# Patient Record
Sex: FEMALE | Race: ASIAN | NOT HISPANIC OR LATINO | Employment: OTHER | URBAN - METROPOLITAN AREA
[De-identification: names, ages, dates, MRNs, and addresses within clinical notes are randomized per-mention and may not be internally consistent; named-entity substitution may affect disease eponyms.]

---

## 2017-05-16 ENCOUNTER — OFFICE VISIT - HEALTHEAST (OUTPATIENT)
Dept: FAMILY MEDICINE | Facility: CLINIC | Age: 57
End: 2017-05-16

## 2017-05-16 DIAGNOSIS — H10.10 ALLERGIC CONJUNCTIVITIS: ICD-10-CM

## 2021-05-19 ENCOUNTER — COMMUNICATION - HEALTHEAST (OUTPATIENT)
Dept: SCHEDULING | Facility: CLINIC | Age: 61
End: 2021-05-19

## 2021-05-31 VITALS — WEIGHT: 129.56 LBS

## 2021-06-10 NOTE — PROGRESS NOTES
ASSESSMENT:  1. Allergic conjunctivitis  loratadine (CLARITIN) 10 mg tablet    ketotifen (ZADITOR) 0.025 % (0.035 %) ophthalmic solution      PLAN:  Likely allergic conjunctivitis. There is no drainage, mattering, or associated URI symptoms concerning for bacterial or viral conjunctivitis. Recommend treatment with loratadine 10mg once daily and Zaditor eye drops.  Avoid scratching the eyes, apply cold compresses for relief.  Follow up with primary care provider or ophthalmologist if not improving in 3-5 days, sooner if any worsening or new symptoms.      SUBJECTIVE:   Donal Coker is a 57 y.o. female presents today with 7 days complaint of bilateral eye redness. Also complains of eye itchiness. The patient states her eyes were not red initially, but have become red and swollen since she has been itchy at them. Denies eye drainage, watering, mattering of lashes, foreign body sensation, vision changes, eye pain. No injury/trauma to the eyes.  Denies fever, chills, sneezing, rhinorrhea, nasal congestion, cough, sore throat. No sick contacts. does not wear contact lenses.     She reports similar symptoms last spring, as well as in the fall the past several years.     Interpretation provided by patient's  per her requests. She declined formal interpretor services.    There is no problem list on file for this patient.      Current Medications:  No current outpatient prescriptions on file prior to visit.     No current facility-administered medications on file prior to visit.        Allergies:   No Known Allergies    OBJECTIVE:    Vitals:    05/16/17 1221   BP: 130/80   Patient Site: Left Arm   Patient Position: Sitting   Cuff Size: Adult Regular   Pulse: 87   Resp: 16   Temp: 97.9  F (36.6  C)   TempSrc: Oral   SpO2: 95%   Weight: 129 lb 9 oz (58.8 kg)       Physical exam reveals a pleasant 57 y.o. female.   Appears healthy, alert, cooperative and in NAD.  Eyes: bilateral conjunctiva erythematous, R>L. No ciliary  flush. No drainage or mattering of the lashes. PERRLA. EOMS intact. negative foreign body.  No periorbital cellulitis.  Ears: bilateral TMs pearly grey, landmarks visualized.    Nasopharynx: pink and moist  Oropharynx: Free of erythema, inflammation or exudate.   Lymph: no cervical LAD  Lungs: Chest is clear, no wheezing or rales. Symmetric air entry throughout both lung fields.  Heart: regular rate and rhythm, no murmur, rub or gallop

## 2023-12-25 ENCOUNTER — HOSPITAL ENCOUNTER (INPATIENT)
Facility: HOSPITAL | Age: 63
LOS: 4 days | Discharge: HOME HEALTH | DRG: 603 | End: 2023-12-29
Attending: EMERGENCY MEDICINE | Admitting: FAMILY MEDICINE
Payer: MEDICARE

## 2023-12-25 ENCOUNTER — APPOINTMENT (OUTPATIENT)
Dept: ULTRASOUND IMAGING | Facility: HOSPITAL | Age: 63
DRG: 603 | End: 2023-12-25
Attending: FAMILY MEDICINE
Payer: MEDICARE

## 2023-12-25 ENCOUNTER — APPOINTMENT (OUTPATIENT)
Dept: RADIOLOGY | Facility: HOSPITAL | Age: 63
DRG: 603 | End: 2023-12-25
Payer: MEDICARE

## 2023-12-25 ENCOUNTER — APPOINTMENT (OUTPATIENT)
Dept: CT IMAGING | Facility: HOSPITAL | Age: 63
DRG: 603 | End: 2023-12-25
Payer: MEDICARE

## 2023-12-25 DIAGNOSIS — R78.81 STAPHYLOCOCCUS AUREUS BACTEREMIA WITHOUT SEPSIS: Primary | ICD-10-CM

## 2023-12-25 DIAGNOSIS — L03.90 CELLULITIS, UNSPECIFIED CELLULITIS SITE: ICD-10-CM

## 2023-12-25 DIAGNOSIS — T14.8XXA WOUND INFECTION: ICD-10-CM

## 2023-12-25 DIAGNOSIS — L08.9 WOUND INFECTION: ICD-10-CM

## 2023-12-25 DIAGNOSIS — B95.61 STAPHYLOCOCCUS AUREUS BACTEREMIA WITHOUT SEPSIS: Primary | ICD-10-CM

## 2023-12-25 LAB
ANION GAP SERPL CALCULATED.3IONS-SCNC: 9 MMOL/L (ref 7–15)
BASOPHILS # BLD AUTO: 0 10E3/UL (ref 0–0.2)
BASOPHILS NFR BLD AUTO: 0 %
BUN SERPL-MCNC: 17.1 MG/DL (ref 8–23)
CALCIUM SERPL-MCNC: 8.8 MG/DL (ref 8.8–10.2)
CHLORIDE SERPL-SCNC: 101 MMOL/L (ref 98–107)
CREAT SERPL-MCNC: 0.67 MG/DL (ref 0.51–0.95)
CRP SERPL-MCNC: 50.5 MG/L
DEPRECATED HCO3 PLAS-SCNC: 28 MMOL/L (ref 22–29)
EGFRCR SERPLBLD CKD-EPI 2021: >90 ML/MIN/1.73M2
EOSINOPHIL # BLD AUTO: 0 10E3/UL (ref 0–0.7)
EOSINOPHIL NFR BLD AUTO: 0 %
ERYTHROCYTE [DISTWIDTH] IN BLOOD BY AUTOMATED COUNT: 12.9 % (ref 10–15)
ERYTHROCYTE [SEDIMENTATION RATE] IN BLOOD BY WESTERGREN METHOD: 76 MM/HR (ref 0–30)
GLUCOSE BLDC GLUCOMTR-MCNC: 123 MG/DL (ref 70–99)
GLUCOSE SERPL-MCNC: 186 MG/DL (ref 70–99)
HBA1C MFR BLD: 5.8 %
HCT VFR BLD AUTO: 38.8 % (ref 35–47)
HGB BLD-MCNC: 12.9 G/DL (ref 11.7–15.7)
HOLD SPECIMEN: NORMAL
IMM GRANULOCYTES # BLD: 0 10E3/UL
IMM GRANULOCYTES NFR BLD: 0 %
LACTATE SERPL-SCNC: 1.8 MMOL/L (ref 0.7–2)
LYMPHOCYTES # BLD AUTO: 0.2 10E3/UL (ref 0.8–5.3)
LYMPHOCYTES NFR BLD AUTO: 4 %
MCH RBC QN AUTO: 31.6 PG (ref 26.5–33)
MCHC RBC AUTO-ENTMCNC: 33.2 G/DL (ref 31.5–36.5)
MCV RBC AUTO: 95 FL (ref 78–100)
MONOCYTES # BLD AUTO: 0.3 10E3/UL (ref 0–1.3)
MONOCYTES NFR BLD AUTO: 6 %
NEUTROPHILS # BLD AUTO: 4.4 10E3/UL (ref 1.6–8.3)
NEUTROPHILS NFR BLD AUTO: 90 %
NRBC # BLD AUTO: 0 10E3/UL
NRBC BLD AUTO-RTO: 0 /100
PLATELET # BLD AUTO: 120 10E3/UL (ref 150–450)
POTASSIUM SERPL-SCNC: 4 MMOL/L (ref 3.4–5.3)
RBC # BLD AUTO: 4.08 10E6/UL (ref 3.8–5.2)
SODIUM SERPL-SCNC: 138 MMOL/L (ref 135–145)
WBC # BLD AUTO: 4.9 10E3/UL (ref 4–11)

## 2023-12-25 PROCEDURE — 250N000011 HC RX IP 250 OP 636

## 2023-12-25 PROCEDURE — 36415 COLL VENOUS BLD VENIPUNCTURE: CPT

## 2023-12-25 PROCEDURE — 80048 BASIC METABOLIC PNL TOTAL CA: CPT

## 2023-12-25 PROCEDURE — 99285 EMERGENCY DEPT VISIT HI MDM: CPT | Mod: 25

## 2023-12-25 PROCEDURE — 120N000001 HC R&B MED SURG/OB

## 2023-12-25 PROCEDURE — 250N000011 HC RX IP 250 OP 636: Performed by: EMERGENCY MEDICINE

## 2023-12-25 PROCEDURE — 70450 CT HEAD/BRAIN W/O DYE: CPT | Mod: ME

## 2023-12-25 PROCEDURE — 258N000003 HC RX IP 258 OP 636: Performed by: EMERGENCY MEDICINE

## 2023-12-25 PROCEDURE — 96365 THER/PROPH/DIAG IV INF INIT: CPT

## 2023-12-25 PROCEDURE — 86140 C-REACTIVE PROTEIN: CPT

## 2023-12-25 PROCEDURE — 96367 TX/PROPH/DG ADDL SEQ IV INF: CPT

## 2023-12-25 PROCEDURE — 93005 ELECTROCARDIOGRAM TRACING: CPT

## 2023-12-25 PROCEDURE — 85025 COMPLETE CBC W/AUTO DIFF WBC: CPT

## 2023-12-25 PROCEDURE — 99223 1ST HOSP IP/OBS HIGH 75: CPT | Mod: AI | Performed by: FAMILY MEDICINE

## 2023-12-25 PROCEDURE — 71046 X-RAY EXAM CHEST 2 VIEWS: CPT

## 2023-12-25 PROCEDURE — 96361 HYDRATE IV INFUSION ADD-ON: CPT

## 2023-12-25 PROCEDURE — 87186 SC STD MICRODIL/AGAR DIL: CPT

## 2023-12-25 PROCEDURE — 87205 SMEAR GRAM STAIN: CPT

## 2023-12-25 PROCEDURE — 250N000011 HC RX IP 250 OP 636: Performed by: FAMILY MEDICINE

## 2023-12-25 PROCEDURE — 250N000013 HC RX MED GY IP 250 OP 250 PS 637: Performed by: EMERGENCY MEDICINE

## 2023-12-25 PROCEDURE — 99222 1ST HOSP IP/OBS MODERATE 55: CPT | Performed by: PODIATRIST

## 2023-12-25 PROCEDURE — 85652 RBC SED RATE AUTOMATED: CPT

## 2023-12-25 PROCEDURE — 83605 ASSAY OF LACTIC ACID: CPT

## 2023-12-25 PROCEDURE — 93925 LOWER EXTREMITY STUDY: CPT

## 2023-12-25 PROCEDURE — 258N000003 HC RX IP 258 OP 636

## 2023-12-25 PROCEDURE — 83036 HEMOGLOBIN GLYCOSYLATED A1C: CPT | Performed by: FAMILY MEDICINE

## 2023-12-25 PROCEDURE — 72125 CT NECK SPINE W/O DYE: CPT | Mod: MF

## 2023-12-25 PROCEDURE — 87149 DNA/RNA DIRECT PROBE: CPT

## 2023-12-25 PROCEDURE — 73610 X-RAY EXAM OF ANKLE: CPT | Mod: RT

## 2023-12-25 RX ORDER — DOCUSATE SODIUM 100 MG/1
100 CAPSULE, LIQUID FILLED ORAL 2 TIMES DAILY
Status: DISCONTINUED | OUTPATIENT
Start: 2023-12-25 | End: 2023-12-29 | Stop reason: HOSPADM

## 2023-12-25 RX ORDER — VANCOMYCIN HYDROCHLORIDE 500 MG/10ML
500 INJECTION, POWDER, LYOPHILIZED, FOR SOLUTION INTRAVENOUS EVERY 12 HOURS
Status: DISCONTINUED | OUTPATIENT
Start: 2023-12-26 | End: 2023-12-27

## 2023-12-25 RX ORDER — ONDANSETRON 2 MG/ML
4 INJECTION INTRAMUSCULAR; INTRAVENOUS EVERY 6 HOURS PRN
Status: DISCONTINUED | OUTPATIENT
Start: 2023-12-25 | End: 2023-12-29 | Stop reason: HOSPADM

## 2023-12-25 RX ORDER — NICOTINE POLACRILEX 4 MG
15-30 LOZENGE BUCCAL
Status: DISCONTINUED | OUTPATIENT
Start: 2023-12-25 | End: 2023-12-29 | Stop reason: HOSPADM

## 2023-12-25 RX ORDER — PIPERACILLIN SODIUM, TAZOBACTAM SODIUM 3; .375 G/15ML; G/15ML
3.38 INJECTION, POWDER, LYOPHILIZED, FOR SOLUTION INTRAVENOUS ONCE
Status: COMPLETED | OUTPATIENT
Start: 2023-12-25 | End: 2023-12-25

## 2023-12-25 RX ORDER — CALCIUM CARBONATE 500 MG/1
1000 TABLET, CHEWABLE ORAL 4 TIMES DAILY PRN
Status: DISCONTINUED | OUTPATIENT
Start: 2023-12-25 | End: 2023-12-29 | Stop reason: HOSPADM

## 2023-12-25 RX ORDER — CEFAZOLIN SODIUM 1 G/50ML
750 SOLUTION INTRAVENOUS ONCE
Status: COMPLETED | OUTPATIENT
Start: 2023-12-25 | End: 2023-12-25

## 2023-12-25 RX ORDER — AMOXICILLIN 250 MG
2 CAPSULE ORAL 2 TIMES DAILY PRN
Status: DISCONTINUED | OUTPATIENT
Start: 2023-12-25 | End: 2023-12-29 | Stop reason: HOSPADM

## 2023-12-25 RX ORDER — ACETAMINOPHEN 325 MG/1
650 TABLET ORAL ONCE
Status: DISCONTINUED | OUTPATIENT
Start: 2023-12-25 | End: 2023-12-25

## 2023-12-25 RX ORDER — QUETIAPINE FUMARATE 25 MG/1
25 TABLET, FILM COATED ORAL EVERY 6 HOURS PRN
Status: DISCONTINUED | OUTPATIENT
Start: 2023-12-25 | End: 2023-12-29 | Stop reason: HOSPADM

## 2023-12-25 RX ORDER — PIPERACILLIN SODIUM, TAZOBACTAM SODIUM 3; .375 G/15ML; G/15ML
3.38 INJECTION, POWDER, LYOPHILIZED, FOR SOLUTION INTRAVENOUS EVERY 8 HOURS
Status: DISCONTINUED | OUTPATIENT
Start: 2023-12-25 | End: 2023-12-28

## 2023-12-25 RX ORDER — AMOXICILLIN 250 MG
1 CAPSULE ORAL 2 TIMES DAILY PRN
Status: DISCONTINUED | OUTPATIENT
Start: 2023-12-25 | End: 2023-12-29 | Stop reason: HOSPADM

## 2023-12-25 RX ORDER — ACETAMINOPHEN 325 MG/1
650 TABLET ORAL EVERY 4 HOURS PRN
Status: DISCONTINUED | OUTPATIENT
Start: 2023-12-25 | End: 2023-12-29 | Stop reason: HOSPADM

## 2023-12-25 RX ORDER — ONDANSETRON 4 MG/1
4 TABLET, ORALLY DISINTEGRATING ORAL EVERY 6 HOURS PRN
Status: DISCONTINUED | OUTPATIENT
Start: 2023-12-25 | End: 2023-12-29 | Stop reason: HOSPADM

## 2023-12-25 RX ORDER — DEXTROSE MONOHYDRATE 25 G/50ML
25-50 INJECTION, SOLUTION INTRAVENOUS
Status: DISCONTINUED | OUTPATIENT
Start: 2023-12-25 | End: 2023-12-29 | Stop reason: HOSPADM

## 2023-12-25 RX ORDER — ACETAMINOPHEN 650 MG/1
650 SUPPOSITORY RECTAL EVERY 4 HOURS PRN
Status: DISCONTINUED | OUTPATIENT
Start: 2023-12-25 | End: 2023-12-29 | Stop reason: HOSPADM

## 2023-12-25 RX ORDER — ACETAMINOPHEN 650 MG/1
650 SUPPOSITORY RECTAL ONCE
Status: COMPLETED | OUTPATIENT
Start: 2023-12-25 | End: 2023-12-25

## 2023-12-25 RX ORDER — LIDOCAINE 40 MG/G
CREAM TOPICAL
Status: DISCONTINUED | OUTPATIENT
Start: 2023-12-25 | End: 2023-12-29 | Stop reason: HOSPADM

## 2023-12-25 RX ADMIN — PIPERACILLIN AND TAZOBACTAM 3.38 G: 3; .375 INJECTION, POWDER, FOR SOLUTION INTRAVENOUS at 16:03

## 2023-12-25 RX ADMIN — ACETAMINOPHEN 650 MG: 650 SUPPOSITORY RECTAL at 14:58

## 2023-12-25 RX ADMIN — SODIUM CHLORIDE 500 ML: 9 INJECTION, SOLUTION INTRAVENOUS at 16:03

## 2023-12-25 RX ADMIN — SODIUM CHLORIDE 500 ML: 9 INJECTION, SOLUTION INTRAVENOUS at 15:23

## 2023-12-25 RX ADMIN — PIPERACILLIN AND TAZOBACTAM 3.38 G: 3; .375 INJECTION, POWDER, FOR SOLUTION INTRAVENOUS at 22:16

## 2023-12-25 RX ADMIN — VANCOMYCIN HYDROCHLORIDE 750 MG: 5 INJECTION, POWDER, LYOPHILIZED, FOR SOLUTION INTRAVENOUS at 17:18

## 2023-12-25 ASSESSMENT — ENCOUNTER SYMPTOMS
FEVER: 1
FATIGUE: 1
WEAKNESS: 1

## 2023-12-25 ASSESSMENT — ACTIVITIES OF DAILY LIVING (ADL)
ADLS_ACUITY_SCORE: 37
ADLS_ACUITY_SCORE: 35
ADLS_ACUITY_SCORE: 37
DEPENDENT_IADLS:: CLEANING;COOKING;LAUNDRY;SHOPPING;MEAL PREPARATION;MEDICATION MANAGEMENT;MONEY MANAGEMENT;TRANSPORTATION;INCONTINENCE
ADLS_ACUITY_SCORE: 35
ADLS_ACUITY_SCORE: 35

## 2023-12-25 NOTE — ED NOTES
BP low 84/52 with MAP of 64. Pt put in reverse trendelenburg. PA notified. Recheck vital when fluid is in per PA. Pressure bagging fluids.

## 2023-12-25 NOTE — PLAN OF CARE
Orthopedic surgery plan of care note:  Received a call from the emergency department regarding this patient.  No imaging obtained as of yet.  Per report ankle wound that recently began draining.  CRP 55.  Recommended MRI of the ankle as well as plain radiographs.  Suspect that this is a soft tissue wound issue and not involving the ankle joint given a CRP of 55 which would rule against deep infection.  Recommend podiatry consultation.  Should there be involvement of the ankle joint proper, orthopedics will assume care.      Lamonte Colby MD  [facility]

## 2023-12-25 NOTE — ED TRIAGE NOTES
Pt with right ankle swelling that started out as a black spot and has grown.  Family reports area is now draining pus.

## 2023-12-25 NOTE — MEDICATION SCRIBE - ADMISSION MEDICATION HISTORY
Medication Scribe Admission Medication History    Admission medication history is complete. The information provided in this note is only as accurate as the sources available at the time of the update.    Information Source(s): Family member and CareEverywhere/SureScripts via in-person    Pertinent Information: daughter manages med's  Daughter states doctor took pt off all med's besides humira, pt currently ONLY on humira     Changes made to PTA medication list:  Added: None  Deleted: arava,metformin,omeprazole,pravastatin,prednisone  Changed: None    Medication Affordability:  Not including over the counter (OTC) medications, was there a time in the past 3 months when you did not take your medications as prescribed because of cost?: No    Allergies reviewed with patient and updates made in EHR: yes    Medication History Completed By: VANDANA LI 12/25/2023 3:59 PM    PTA Med List   Medication Sig Last Dose    adalimumab (HUMIRA *CF*) 40 MG/0.4ML pen kit Inject 40 mg Subcutaneous every 14 days Past Week at past week

## 2023-12-25 NOTE — ED PROVIDER NOTES
Emergency Department Midlevel Supervisory Note     I personally saw the patient and performed a substantive portion of the visit including all aspects of the medical decision making.    ED Course:  2:20 PM Merry Montelongo PA-C staffed patient with me. I agree with their assessment and plan of management, and I will see the patient.  2:25 PM I met with the patient to introduce myself, gather additional history, perform my initial exam, and discuss the plan.     63 year old female, history of RA on Humira, HLD and Alzheimer's dementia, who presents with family for evaluation of atraumatic right ankle wound infection. She developed a callus-like wound to this area months ago, but it recently has started to blacken with some bleeding and purulent drainage. She also had tactile fever this morning; patient febrile to 102.3 on presentation to ED.     On exam, there is erythema and swelling over the right lateral malleolus with underlying necrosis  (SEE PHOTO BELOW) concerning for infection.     [facility] consulted and advised to hold on antibiotics unless she has signs of sepsis. They also advised to call Podiatry.     Labs remarkable for no leukocytosis (WBC 4.9) with normal lactate (1.8). CRP and ESR are elevated (50.5 and 76, respectively).     She has no electrolyte derangements or renal impairment. Serum glucose elevated (186) without elevated anion gap or acidosis to suggest DKA.    Blood cultures were obtained.    [facility] called back and advised antibiotics and admission, however continue to try to reach Podiatry.    Patient given IV vancomycin and IV Zosyn.    X-rays right ankle demonstrated normal joint spaces and alignment with no acute fracture. No evidence of osseous destruction to suggest osteomyelitis.    MRI ankle ordered for further evaluation.     Podiatry will present to see the patient within the hour.    Patient also had a fall on Friday (4 days ago) hitting the front and back of  her head. No LOC, nausea / vomiting or behavior changes and she is not anticoagulated.     Head CT and cervical spine CT performed and were without acute traumatic findings.    Patient became hypotensive (upper 70s to lower 80s) for which she was given 30 ml/kg IV fluids with improvement. Given her small size (70 pounds), I suspect her blood pressures run lower; I did look through clinic notes and I was unable to find a documented blood pressure.    EKG performed and was of poor quality due to tremor making rhythm difficult to determine. Cardiology consulted and did not think there was anything too concerning for a heart block, however difficult to completely rule it out due to poor quality.    Patient admitted to Hospital Service. Blood pressures improved with IV fluids.       FINAL IMPRESSION:    ICD-10-CM    1. Wound infection  T14.8XXA     L08.9       2. Cellulitis, unspecified cellulitis site  L03.90           MEDICATIONS GIVEN IN THE EMERGENCY DEPARTMENT:  Medications   vancomycin (VANCOCIN) 750 mg in sodium chloride 0.9 % 250 mL intermittent infusion (750 mg Intravenous $New Bag 12/25/23 1718)   acetaminophen (TYLENOL) Suppository 650 mg (650 mg Rectal $Given 12/25/23 1458)   sodium chloride 0.9% BOLUS 500 mL (0 mLs Intravenous Stopped 12/25/23 1545)   sodium chloride 0.9% BOLUS 500 mL (0 mLs Intravenous Stopped 12/25/23 1723)   piperacillin-tazobactam (ZOSYN) 3.375 g vial to attach to  mL bag (0 g Intravenous Stopped 12/25/23 1646)       NEW PRESCRIPTIONS STARTED AT TODAY'S ED VISIT:  New Prescriptions    No medications on file           CHIEF COMPLAINT:  Wound Infection      Triage Note:      Pt with right ankle swelling that started out as a black spot and has grown.  Family reports area is now draining pus.        HPI     HPI     Donal Sheela is a 63 year old female with a pertinent history of RA on Humira, HLD and Alzheimer's dementia, who presents to this ED with family for evaluation of wound  infection.    History obtained from patient's  and her daughter; patient is not communicative secondary to dementia.    Per family, patient had a fall on Friday hitting the front of her head and then the posterior aspect of her head. She did not sustain LOC and has not had any vomiting. She is not anticoagulated. She seemed fine, so family did not bring her in for evaluation after the fall.    They report that she had a callus-like wound to the right lateral malleolus that has been there for months. Recently, the wound has started to blacken and it started bleeding and draining some purulent fluid Friday. Today she felt warm and family had concern that she had a fever.       Medical History     Past Medical History:   Diagnosis Date    Dementia (H)     Depression     DM2 (diabetes mellitus, type 2) (H)     Hypercholesteremia     Rheumatoid aortitis        Past Surgical History:   Procedure Laterality Date     SECTION         No family history on file.    Social History     Tobacco Use    Smoking status: Never       adalimumab (HUMIRA *CF*) 40 MG/0.4ML pen kit  BD ULTRA FINE PEN NEEDLES  Blood Glucose Monitoring Suppl (CONTOUR BLOOD GLUCOSE SYSTEM) YENI  Glucose Blood (JESSI CONTOUR TEST) strip        Physical Exam     First Vitals:  Patient Vitals for the past 24 hrs:   BP Temp Temp src Pulse Resp SpO2 Height Weight   23 1745 92/68 -- -- 100 24 100 % -- --   23 1730 (!) 89/55 -- -- 74 19 100 % -- --   23 1700 117/57 98.7  F (37.1  C) Rectal 98 24 100 % -- --   23 1655 117/57 -- -- 92 24 100 % -- --   23 1600 (!) 136/94 -- -- 96 26 100 % -- --   23 1545 100/49 -- -- 91 24 100 % -- --   23 1540 (!) 82/51 -- -- 87 22 100 % -- --   23 1533 (!) 84/52 -- -- -- -- -- -- --   23 1530 (!) 79/50 -- -- 77 12 99 % -- --   23 1529 -- 100.4  F (38  C) Oral -- -- -- -- --   23 1515 106/55 -- -- 80 23 99 % -- --   23 1500 96/51 -- -- 116 18 98 %  "1.473 m (4' 10\") 31.8 kg (70 lb)   12/25/23 1400 (!) 164/80 (!) 102.3  F (39.1  C) -- 99 -- 93 % -- --       PHYSICAL EXAM:   Physical Exam    GENERAL: Awake, alert.  Mildly agitated, however RN is placing IV. Patient is very frail.  HEENT: Normocephalic. Ecchymosis and mild frontal hematoma over forehead. Pupils equal, round and reactive. Conjunctiva normal.  NECK: No stridor.  PULMONARY: Symmetrical breath sounds without distress.  Lungs clear to auscultation bilaterally without wheezes, rhonchi or rales.  CARDIO: Borderline tachycardic rate with regular rhythm.  No significant murmur, rub or gallop.    ABDOMINAL: Abdomen soft, non-distended and non-tender to palpation.    EXTREMITIES: There is erythema and swelling over the right lateral malleolus with underlying necrosis - SEE PHOTO BELOW. The foot is warm and well perfused with strong pedal pulse.       NEURO: Awake and alert. Cranial nerves grossly intact.  No focal motor deficit.  PSYCH: Mildly agitated.   SKIN: Erythema, swelling with underlying necrosis - see photo          Results     LAB:  All pertinent labs reviewed and interpreted  Labs Ordered and Resulted from Time of ED Arrival to Time of ED Departure   BASIC METABOLIC PANEL - Abnormal       Result Value    Sodium 138      Potassium 4.0      Chloride 101      Carbon Dioxide (CO2) 28      Anion Gap 9      Urea Nitrogen 17.1      Creatinine 0.67      GFR Estimate >90      Calcium 8.8      Glucose 186 (*)    ERYTHROCYTE SEDIMENTATION RATE AUTO - Abnormal    Erythrocyte Sedimentation Rate 76 (*)    CRP INFLAMMATION - Abnormal    CRP Inflammation 50.50 (*)    CBC WITH PLATELETS AND DIFFERENTIAL - Abnormal    WBC Count 4.9      RBC Count 4.08      Hemoglobin 12.9      Hematocrit 38.8      MCV 95      MCH 31.6      MCHC 33.2      RDW 12.9      Platelet Count 120 (*)     % Neutrophils 90      % Lymphocytes 4      % Monocytes 6      % Eosinophils 0      % Basophils 0      % Immature Granulocytes 0      NRBCs " per 100 WBC 0      Absolute Neutrophils 4.4      Absolute Lymphocytes 0.2 (*)     Absolute Monocytes 0.3      Absolute Eosinophils 0.0      Absolute Basophils 0.0      Absolute Immature Granulocytes 0.0      Absolute NRBCs 0.0     LACTIC ACID WHOLE BLOOD - Normal    Lactic Acid 1.8     BLOOD CULTURE   BLOOD CULTURE   AEROBIC BACTERIAL CULTURE ROUTINE       RADIOLOGY:  Ankle XR, G/E 3 views, right   Final Result   IMPRESSION: Normal joint spaces and alignment. No or acute fracture. No evidence of osseous destruction to suggest osteomyelitis, however MRI is more sensitive if indicated. Plantar and retrocalcaneal enthesophytes.      Chest XR,  PA & LAT   Final Result   IMPRESSION: Heart is normal in size. Lungs are clear.      CT Cervical Spine w/o Contrast   Final Result   IMPRESSION:      HEAD CT:   1. Senescent changes and sequelae of chronic microangiopathy without acute intracranial abnormality.      CERVICAL SPINE CT:   1. No acute traumatic injury of the cervical spine.       Head CT w/o contrast   Final Result   IMPRESSION:      HEAD CT:   1. Senescent changes and sequelae of chronic microangiopathy without acute intracranial abnormality.      CERVICAL SPINE CT:   1. No acute traumatic injury of the cervical spine.       MR Ankle Right w/o & w Contrast    (Results Pending)     EC2023, 16:48; narrow complex undetermined rhythm with tachycardic rate of 112 bpm; prolonged QT (515ms); no obvious ST-T wave changes consistent with ACS or pericarditis; compared to previous EKG dated 2021, the rhythm is now undetermined, however EKG is of poor quality    EKG independently reviewed and interpreted by MD Radha Bobby MD  Emergency Medicine  [facility] EMERGENCY DEPARTMENT           Radha Cortes MD  [phone]

## 2023-12-25 NOTE — ED PROVIDER NOTES
EMERGENCY DEPARTMENT ENCOUNTER      NAME: Donal Coker  AGE: 63 year old female  YOB: 1960  MRN: [id number]  EVALUATION DATE & TIME: 12/25/2023  2:06 PM    PCP: No primary care provider on file.    ED PROVIDER: Merry Montelongo PA-C      Chief Complaint   Patient presents with    Wound Infection       FINAL IMPRESSION:  1. Wound infection    2. Cellulitis, unspecified cellulitis site      ED COURSE & MEDICAL DECISION MAKING:    Pertinent Labs & Imaging studies reviewed. (See chart for details)  63 year old female presents to the Emergency Department for evaluation of ankle pain.  4 days ago patient's family notes when the patient was leaned up against the bed patient slumped over and fell onto the ground.  Patient hit her head at that time.  No blood thinners on board.  Patient's family reports that patient has been acting normally since the incident.  Patient has Alzheimer's and does not communicate or able to express when in pain.  Patient's daughter noted a chronic callus on the right lateral malleolus.  Today patient started to notice that the callus started to drain pus and had surrounding redness and warmth.  Patient's  noted the patient having a fever this morning.  Vital signs reviewed patient is hypertensive and febrile.  This is most likely due to her ankle infection as well as pain.  Vitals improved significantly throughout her ED visit.  On exam, pupils are equal round and reactive.  Patient moving all 4 extremities without difficulty.  Patient has a large bruise over her right eye from her previous fall.  Patient has a quarter sized wound on the lateral right malleolus with surrounding erythema, edema and warmth.  There is purulent drainage.  No bone visualized.  Patient is tender to palpation of that right ankle as she pulls away during palpation.  Pulses are 2+ bilaterally.    Lactic acid is 1.8.  CBC no white blood cell elevation.  Hemoglobin is stable..  Basic is reassuring.  CRP  is 50.50 and ESR 76. EKG is pending. X-ray of the ankle is pending.   Head CT and neck CT is pending.  I spoke with [facility] who recommended doing an MRI to determine whether the infection is in the bone versus tissue infection.  MR of the ankle is pending. Dr. Campbell would like to postpone antibiotics and does not want a wound culture.  Dr. Campbell from [facility] also recommended I contact podiatry and discussed the case with them.  Podiatry recommended admission and antibiotics. Zosyn and Vanco were started on the patient.  We started fluids on the patient.  Patient was given Tylenol for her fever which improved.  Patient is resting comfortably. I Spoke with the hospitalist who agrees to admit the patient.      ED COURSE:   2:16 PM I saw the patient.   2:24 PM Staffed with Dr. Cortes.  2:42 PM Spoke with Dr Colby, [facility].  3:55 PM Spoke with Dr. Powers, Podiatry.  4:00 PM spoke with family and updated the patient and family on the plan.  All questions answered.  They agree to admission.  All questions answered.  4:07 PM Spoke with Dr. Pope, Hospitalist regarding patient's admission.         At the conclusion of the encounter I discussed the results of all of the tests and the disposition. The questions were answered. The patient or family acknowledged understanding and was agreeable with the care plan.     0 minutes of critical care time       Additional Documentation    History:  Supplemental history from: Documented in chart, if applicable  External Record(s) reviewed: Outpatient Record: Office visit to [facility] for follow up on 8/28/2023    Work Up:  Chart documentation includes differential considered and any EKGs or imaging interpreted by provider.  In additional to work up documented, I considered the following work up: Documented in chart, if applicable.    External consultation:  Discussion of management with another provider: Hospitalist and  "Orthopedics    Complicating factors:  Care impacted by chronic illness: Dementia, Diabetes, and Other: Osteoarthritis and RA  Care affected by social determinants of health: N/A    Disposition considerations: Admit.      MEDICATIONS GIVEN IN THE EMERGENCY:  Medications   piperacillin-tazobactam (ZOSYN) 3.375 g vial to attach to  mL bag (3.375 g Intravenous $New Bag 12/25/23 1603)   acetaminophen (TYLENOL) Suppository 650 mg (650 mg Rectal $Given 12/25/23 8988)   sodium chloride 0.9% BOLUS 500 mL (0 mLs Intravenous Stopped 12/25/23 1545)   sodium chloride 0.9% BOLUS 500 mL (500 mLs Intravenous $New Bag 12/25/23 1603)       NEW PRESCRIPTIONS STARTED AT TODAY'S ER VISIT  New Prescriptions    No medications on file          =================================================================    HPI    Patient information was obtained from: Patient's family    Use of : N/A        Donal Coker is a 63 year old female with a pertinent history of type 2 diabetes mellitus, HLD, Alzheimer's disease, osteoarthritis, and RA, who presents to this ED via private car for evaluation of wound infection.    Per patient's family members, reports patient has \"dry skin\" and callus on the right lower lateral aspect of right foot that initially appeared black for several months. Mentions wound started to bleed and drain pus ~3 days ago (12/22). She has been increasingly weak and fatigue the past 3 days. She had a fever of 100 F. She has been rapidly unintenionally losing weight and declining. She has been increasingly weak, fatige, prone to fall. A few days ago, patient fell two times, one instance was when she was slumped on the side of the bed. She did hit the front and posterior aspect of her head, and was not evaluated then. At baseline, patient was able to walk slowly on her own, but the since this past WKND, she hasn't been able to stand without assistance. Patient has Parkinson's disease and can't communicate or express " "when in pain. She has a hx of DM and RA, was recently taken of metformin, and only daily medicaion is Humira for RA. No cardiac history. Patient's BG hasn't been checked regularly. No other reported complaints or concerns at this time.    Per chart review, the patient presented to [facility] for follow up on 2023. CXR was negative. Noted is a 8 mm calcification right upper quadrant may represent a calcified gallstone or kidney stone. Advised to stop taking metaformin. Continue taking calcium & vitamin D supplement. On 2023, patient's hgba1c was 6.1 %. ALT was 130 and AST was 58. Hep A IgM Ab was negative. She was prescribed mupirocin.    REVIEW OF SYSTEMS   Review of Systems   Constitutional:  Positive for fatigue and fever.   Skin:         Positive for callus with drainage on right lower lateral aspect of right foot   Neurological:  Positive for weakness (generalized).   All other systems reviewed and are negative.       PAST MEDICAL HISTORY:  Past Medical History:   Diagnosis Date    Dementia (H)     Depression     DM2 (diabetes mellitus, type 2) (H)     Hypercholesteremia     Rheumatoid aortitis        PAST SURGICAL HISTORY:  Past Surgical History:   Procedure Laterality Date     SECTION             CURRENT MEDICATIONS:    adalimumab (HUMIRA *CF*) 40 MG/0.4ML pen kit  BD ULTRA FINE PEN NEEDLES  Blood Glucose Monitoring Suppl (CONTOUR BLOOD GLUCOSE SYSTEM) YENI  Glucose Blood (JESSI CONTOUR TEST) strip        ALLERGIES:  No Known Allergies    FAMILY HISTORY:  No family history on file.    SOCIAL HISTORY:   Social History     Socioeconomic History    Marital status:    Tobacco Use    Smoking status: Never       VITALS:  BP (!) 136/94   Pulse 96   Temp 100.4  F (38  C) (Oral)   Resp 26   Ht 1.473 m (4' 10\")   Wt 31.8 kg (70 lb)   SpO2 100%   BMI 14.63 kg/m      PHYSICAL EXAM    Physical Exam  Vitals and nursing note reviewed.   Constitutional:       Appearance: " Normal appearance.   HENT:      Head: Atraumatic.      Right Ear: External ear normal.      Left Ear: External ear normal.      Nose: Nose normal.      Mouth/Throat:      Mouth: Mucous membranes are moist.   Eyes:      Conjunctiva/sclera: Conjunctivae normal.      Pupils: Pupils are equal, round, and reactive to light.   Cardiovascular:      Rate and Rhythm: Normal rate and regular rhythm.      Pulses: Normal pulses.      Heart sounds: Normal heart sounds. No murmur heard.     No friction rub. No gallop.   Pulmonary:      Effort: Pulmonary effort is normal.      Breath sounds: Normal breath sounds. No wheezing or rales.   Abdominal:      Tenderness: There is no abdominal tenderness. There is no guarding or rebound.   Musculoskeletal:      Cervical back: Normal range of motion.      Comments: Right lateral malleolus has a quarter size wound that has surrounding erythema, edema, warmth.  No ecchymosis.  Purulent drainage.  Right ankle is tender to palpation.  Remainder of extremities are nontender to palpation.  Chest wall is nontender to palpation.  Abdomen is soft and nontender.  Cervical, thoracic, lumbar, sacral paraspinal muscles and spasm are nontender to palpation.  Scalp is nontender to palpation.   Skin:     General: Skin is dry.   Neurological:      Mental Status: She is alert. Mental status is at baseline.   Psychiatric:         Mood and Affect: Mood normal.         Thought Content: Thought content normal.          LAB:  All pertinent labs reviewed and interpreted.  Labs Ordered and Resulted from Time of ED Arrival to Time of ED Departure   BASIC METABOLIC PANEL - Abnormal       Result Value    Sodium 138      Potassium 4.0      Chloride 101      Carbon Dioxide (CO2) 28      Anion Gap 9      Urea Nitrogen 17.1      Creatinine 0.67      GFR Estimate >90      Calcium 8.8      Glucose 186 (*)    ERYTHROCYTE SEDIMENTATION RATE AUTO - Abnormal    Erythrocyte Sedimentation Rate 76 (*)    CRP INFLAMMATION -  Abnormal    CRP Inflammation 50.50 (*)    CBC WITH PLATELETS AND DIFFERENTIAL - Abnormal    WBC Count 4.9      RBC Count 4.08      Hemoglobin 12.9      Hematocrit 38.8      MCV 95      MCH 31.6      MCHC 33.2      RDW 12.9      Platelet Count 120 (*)     % Neutrophils 90      % Lymphocytes 4      % Monocytes 6      % Eosinophils 0      % Basophils 0      % Immature Granulocytes 0      NRBCs per 100 WBC 0      Absolute Neutrophils 4.4      Absolute Lymphocytes 0.2 (*)     Absolute Monocytes 0.3      Absolute Eosinophils 0.0      Absolute Basophils 0.0      Absolute Immature Granulocytes 0.0      Absolute NRBCs 0.0     LACTIC ACID WHOLE BLOOD - Normal    Lactic Acid 1.8     BLOOD CULTURE   BLOOD CULTURE   AEROBIC BACTERIAL CULTURE ROUTINE        RADIOLOGY:  Reviewed all pertinent imaging. Please see official radiology report.  Head CT w/o contrast    (Results Pending)   CT Cervical Spine w/o Contrast    (Results Pending)   Ankle XR, G/E 3 views, right    (Results Pending)   Chest XR,  PA & LAT    (Results Pending)   MR Ankle Right w/o & w Contrast    (Results Pending)     EKG is pending. See attending note.    I, Marichuy Pineda, am serving as a scribe to document services personally performed by Merry Montelongo PA-C, based on my observation and the provider's statements to me. I, Merry Montelongo PA-C, attest that Marichuy Pineda is acting in a scribe capacity, has observed my performance of the services and has documented them in accordance with my direction.    Merry Montelongo PA-C  [facility] EMERGENCY DEPARTMENT  [address] [place] [id number]  [phone]     Merry Montelongo PA-C  12/25/23 9889       Merry Montelongo PA-C  12/25/23 7548

## 2023-12-25 NOTE — PHARMACY-VANCOMYCIN DOSING SERVICE
Pharmacy Vancomycin Initial Note  Date of Service 2023  Patient's  1960  63 year old, female    Indication: Sepsis    Current estimated CrCl = Estimated Creatinine Clearance: 43.1 mL/min (based on SCr of 0.67 mg/dL).    Creatinine for last 3 days  2023:  2:23 PM Creatinine 0.67 mg/dL    Recent Vancomycin Level(s) for last 3 days  No results found for requested labs within last 3 days.      Vancomycin IV Administrations (past 72 hours)        No vancomycin orders with administrations in past 72 hours.                    Nephrotoxins and other renal medications (From now, onward)      Start     Dose/Rate Route Frequency Ordered Stop    23 1700  vancomycin (VANCOCIN) 750 mg in sodium chloride 0.9 % 250 mL intermittent infusion         750 mg  over 60 Minutes Intravenous ONCE 23 1650              Contrast Orders - past 72 hours (72h ago, onward)      None            Plan:  Vancomycin  750 mg IV once  Please re-consult pharmacist if vancomycin is to be continued inpatient    Leia Maharaj, PharmD, BCPS 23 4:51 PM

## 2023-12-26 ENCOUNTER — APPOINTMENT (OUTPATIENT)
Dept: ULTRASOUND IMAGING | Facility: HOSPITAL | Age: 63
DRG: 603 | End: 2023-12-26
Attending: FAMILY MEDICINE
Payer: MEDICARE

## 2023-12-26 ENCOUNTER — APPOINTMENT (OUTPATIENT)
Dept: MRI IMAGING | Facility: HOSPITAL | Age: 63
DRG: 603 | End: 2023-12-26
Attending: FAMILY MEDICINE
Payer: MEDICARE

## 2023-12-26 LAB
ANION GAP SERPL CALCULATED.3IONS-SCNC: 6 MMOL/L (ref 7–15)
BUN SERPL-MCNC: 10.5 MG/DL (ref 8–23)
CALCIUM SERPL-MCNC: 8.6 MG/DL (ref 8.8–10.2)
CHLORIDE SERPL-SCNC: 107 MMOL/L (ref 98–107)
CREAT SERPL-MCNC: 0.62 MG/DL (ref 0.51–0.95)
CRP SERPL-MCNC: 57.1 MG/L
DEPRECATED HCO3 PLAS-SCNC: 28 MMOL/L (ref 22–29)
EGFRCR SERPLBLD CKD-EPI 2021: >90 ML/MIN/1.73M2
ENTEROCOCCUS FAECALIS: NOT DETECTED
ENTEROCOCCUS FAECIUM: NOT DETECTED
ERYTHROCYTE [DISTWIDTH] IN BLOOD BY AUTOMATED COUNT: 13 % (ref 10–15)
GLUCOSE BLDC GLUCOMTR-MCNC: 126 MG/DL (ref 70–99)
GLUCOSE BLDC GLUCOMTR-MCNC: 228 MG/DL (ref 70–99)
GLUCOSE BLDC GLUCOMTR-MCNC: 256 MG/DL (ref 70–99)
GLUCOSE BLDC GLUCOMTR-MCNC: 90 MG/DL (ref 70–99)
GLUCOSE SERPL-MCNC: 105 MG/DL (ref 70–99)
HCT VFR BLD AUTO: 33 % (ref 35–47)
HGB BLD-MCNC: 10.9 G/DL (ref 11.7–15.7)
LISTERIA SPECIES (DETECTED/NOT DETECTED): NOT DETECTED
MCH RBC QN AUTO: 31.7 PG (ref 26.5–33)
MCHC RBC AUTO-ENTMCNC: 33 G/DL (ref 31.5–36.5)
MCV RBC AUTO: 96 FL (ref 78–100)
PLATELET # BLD AUTO: 93 10E3/UL (ref 150–450)
POTASSIUM SERPL-SCNC: 4.1 MMOL/L (ref 3.4–5.3)
RBC # BLD AUTO: 3.44 10E6/UL (ref 3.8–5.2)
SODIUM SERPL-SCNC: 141 MMOL/L (ref 135–145)
STAPHYLOCOCCUS AUREUS: DETECTED
STAPHYLOCOCCUS EPIDERMIDIS: NOT DETECTED
STAPHYLOCOCCUS LUGDUNENSIS: NOT DETECTED
STREPTOCOCCUS AGALACTIAE: NOT DETECTED
STREPTOCOCCUS ANGINOSUS GROUP: NOT DETECTED
STREPTOCOCCUS PNEUMONIAE: NOT DETECTED
STREPTOCOCCUS PYOGENES: NOT DETECTED
STREPTOCOCCUS SPECIES: NOT DETECTED
WBC # BLD AUTO: 3.4 10E3/UL (ref 4–11)

## 2023-12-26 PROCEDURE — A9585 GADOBUTROL INJECTION: HCPCS | Mod: JZ | Performed by: EMERGENCY MEDICINE

## 2023-12-26 PROCEDURE — 99232 SBSQ HOSP IP/OBS MODERATE 35: CPT | Performed by: EMERGENCY MEDICINE

## 2023-12-26 PROCEDURE — 87040 BLOOD CULTURE FOR BACTERIA: CPT

## 2023-12-26 PROCEDURE — 80048 BASIC METABOLIC PNL TOTAL CA: CPT | Performed by: FAMILY MEDICINE

## 2023-12-26 PROCEDURE — 250N000011 HC RX IP 250 OP 636: Performed by: FAMILY MEDICINE

## 2023-12-26 PROCEDURE — 99222 1ST HOSP IP/OBS MODERATE 55: CPT

## 2023-12-26 PROCEDURE — 120N000001 HC R&B MED SURG/OB

## 2023-12-26 PROCEDURE — 36415 COLL VENOUS BLD VENIPUNCTURE: CPT

## 2023-12-26 PROCEDURE — 93922 UPR/L XTREMITY ART 2 LEVELS: CPT

## 2023-12-26 PROCEDURE — 85027 COMPLETE CBC AUTOMATED: CPT | Performed by: FAMILY MEDICINE

## 2023-12-26 PROCEDURE — 36415 COLL VENOUS BLD VENIPUNCTURE: CPT | Performed by: FAMILY MEDICINE

## 2023-12-26 PROCEDURE — 250N000013 HC RX MED GY IP 250 OP 250 PS 637: Performed by: FAMILY MEDICINE

## 2023-12-26 PROCEDURE — 250N000012 HC RX MED GY IP 250 OP 636 PS 637: Performed by: FAMILY MEDICINE

## 2023-12-26 PROCEDURE — 255N000002 HC RX 255 OP 636: Mod: JZ | Performed by: EMERGENCY MEDICINE

## 2023-12-26 PROCEDURE — 73723 MRI JOINT LWR EXTR W/O&W/DYE: CPT | Mod: RT,MG

## 2023-12-26 PROCEDURE — 86140 C-REACTIVE PROTEIN: CPT | Performed by: FAMILY MEDICINE

## 2023-12-26 PROCEDURE — G0463 HOSPITAL OUTPT CLINIC VISIT: HCPCS

## 2023-12-26 RX ORDER — GADOBUTROL 604.72 MG/ML
4 INJECTION INTRAVENOUS ONCE
Status: COMPLETED | OUTPATIENT
Start: 2023-12-26 | End: 2023-12-26

## 2023-12-26 RX ADMIN — PIPERACILLIN AND TAZOBACTAM 3.38 G: 3; .375 INJECTION, POWDER, FOR SOLUTION INTRAVENOUS at 06:48

## 2023-12-26 RX ADMIN — QUETIAPINE FUMARATE 25 MG: 25 TABLET ORAL at 16:22

## 2023-12-26 RX ADMIN — PIPERACILLIN AND TAZOBACTAM 3.38 G: 3; .375 INJECTION, POWDER, FOR SOLUTION INTRAVENOUS at 15:36

## 2023-12-26 RX ADMIN — GADOBUTROL 4 ML: 604.72 INJECTION INTRAVENOUS at 18:05

## 2023-12-26 RX ADMIN — ACETAMINOPHEN 650 MG: 650 SUPPOSITORY RECTAL at 15:39

## 2023-12-26 RX ADMIN — VANCOMYCIN HYDROCHLORIDE 500 MG: 500 INJECTION, POWDER, LYOPHILIZED, FOR SOLUTION INTRAVENOUS at 08:22

## 2023-12-26 RX ADMIN — INSULIN ASPART 2 UNITS: 100 INJECTION, SOLUTION INTRAVENOUS; SUBCUTANEOUS at 12:33

## 2023-12-26 RX ADMIN — VANCOMYCIN HYDROCHLORIDE 500 MG: 500 INJECTION, POWDER, LYOPHILIZED, FOR SOLUTION INTRAVENOUS at 19:24

## 2023-12-26 ASSESSMENT — ACTIVITIES OF DAILY LIVING (ADL)
ADLS_ACUITY_SCORE: 37
ADLS_ACUITY_SCORE: 56
ADLS_ACUITY_SCORE: 40
ADLS_ACUITY_SCORE: 37
ADLS_ACUITY_SCORE: 37
ADLS_ACUITY_SCORE: 56
ADLS_ACUITY_SCORE: 56
ADLS_ACUITY_SCORE: 37
ADLS_ACUITY_SCORE: 56
ADLS_ACUITY_SCORE: 37

## 2023-12-26 NOTE — PROGRESS NOTES
Daily Progress Note    Assessment/Plan:  Donal Coker is a 63 year old female admitted with complicated skin infection and now with bacteremia     Sepsis due to right ankle infection, blood cultures now with gram-positive cocci and gram positive bacilli.sepsis diagnosis based on Temp > 38 C, HR > 90 bpm, and RR > 20 breaths/min due to infection.  Continue Zosyn and vancomycin.  -ID following, checking daily blood cultures.  --- Requested wound culture  Podiatry following, may possibly need debridement.  MRI ordered, if MRI abnormal or shows ankle joint involvement orthopedics need to be reconsulted.  Admitting hospitalist discussed with both teams  ABIs ordered     Diabetes mellitus, type II  --- Has been off medications since summer  --- A1c on admission was 5.8  --- Insulin sliding scale while here     Rheumatoid arthritis  --- On Remicade infusion     Dementia  --- She is at risk for encephalopathy.  Encephalopathy order set initiated           DVT Prophylaxis: Pneumatic Compression Devices      Code status:Full Code        Barriers to Discharge: Bacteremia, IV antibiotics, continued workup    Disposition: Anticipate multiday stay    Subjective:  Donal is new to me today.  Chart reviewed discussed with staff.  She has very little communication secondary to her dementia, her daughter is present.  Her daughter notes no new concerns.  She has been afebrile, no evidence of discomfort and has been eating and drinking.        Current Medications Reviewed via EHR List    Objective:  Vital signs in last 24 hours:  [unfilled]  .prog  Weight:   @THISENCWEIGHTS(1)@  Weight change:   Body mass index is 15.67 kg/m .    Intake/Output last 3 shifts:  I/O last 3 completed shifts:  In: 1370 [P.O.:120; IV Piggyback:1250]  Out: 50 [Urine:50]  Intake/Output this shift:  No intake/output data recorded.    Physical Exam:  General: No apparent distress, lying in bed  CV: Regular rate and rhythm  Lungs: Clear to auscultation  Abdomen: Soft,  no obvious tenderness        Imaging:  Personally Reviewed.  US Lower Extremity Arterial Duplex Bilateral    Result Date: 12/25/2023  EXAM: US LOWER EXTREMITY ARTERIAL DUPLEX BILATERAL LOCATION: [facility] DATE: 12/25/2023 INDICATION: right lower exttremity infection, sepsis, difficult to palpate posterior tibial artery COMPARISON: None. TECHNIQUE: Duplex utilizing 2D gray-scale imaging, Doppler interrogation with color-flow and spectral waveform analysis. FINDINGS: RIGHT- Significant calcified and noncalcified atherosclerosis seen throughout the right lower extremity. Largely multiphasic waveforms to the level of the knee. The posterior tibial, anterior tibial and dorsalis pedis arteries are all patent at the level  of the ankle, although posterior tibial and anterior tibial arteries demonstrate monophasic waveforms. No evidence of distinct critical stenosis or occlusion. LEFT- Significant calcified and noncalcified atherosclerosis. Normal multiphasic waveforms throughout the left lower extremity to the level of the ankle. No evidence of critical stenosis or occlusion. RIGHT LOWER EXTREMITY ARTERIAL ASSESSMENT: External iliac artery 132 cm/s Common femoral artery: 71 cm/s Profunda femoris artery: 62 cm/s SFA (proximal): 104 cm/s SFA (mid): 109 cm/s SFA (distal): 161 cm/s Popliteal artery (proximal): 153 cm/s Popliteal artery (distal): 78 cm/s Posterior tibial artery: 54 cm/s Anterior tibial artery: 87 cm/s Dorsalis pedis artery: 82 cm/s LEFT LOWER EXTREMITY ARTERIAL ASSESSMENT: External iliac artery 105 cm/s Common femoral artery: 96 cm/s Profunda femoris artery: 111 cm/s SFA (proximal): 97 cm/s SFA (mid): 115 cm/s SFA (distal): 109 cm/s Popliteal artery (proximal): 57 cm/s Popliteal artery (distal): 67 cm/s Posterior tibial artery: 56 cm/s Anterior tibial artery: 81 cm/s Dorsalis pedis artery: 21 cm/s     IMPRESSION: 1.  Significant calcified and noncalcified atherosclerosis throughout the  bilateral lower extremities, right slightly greater than left. No evidence of critical stenosis or occlusion bilaterally.     Chest XR,  PA & LAT    Result Date: 12/25/2023  EXAM: XR CHEST 2 VIEWS LOCATION: [facility] DATE: 12/25/2023 INDICATION: fall, pain COMPARISON: None.     IMPRESSION: Heart is normal in size. Lungs are clear.    Ankle XR, G/E 3 views, right    Result Date: 12/25/2023  EXAM: XR ANKLE RIGHT G/E 3 VIEWS LOCATION: [facility] DATE: 12/25/2023 INDICATION: Pain. Wound COMPARISON: None.     IMPRESSION: Normal joint spaces and alignment. No or acute fracture. No evidence of osseous destruction to suggest osteomyelitis, however MRI is more sensitive if indicated. Plantar and retrocalcaneal enthesophytes.    Head CT w/o contrast    Result Date: 12/25/2023  EXAM: CT HEAD W/O CONTRAST, CT CERVICAL SPINE W/O CONTRAST LOCATION: [facility] DATE/TIME: 12/25/2023 4:30 PM CST INDICATION: Headache and neck pain after trauma COMPARISON: CT head dated 05/18/2021 TECHNIQUE: 1) Routine CT Head without IV contrast. Multiplanar reformats. Dose reduction techniques were used. 2) Routine CT Cervical Spine without IV contrast. Multiplanar reformats. Dose reduction techniques were used. FINDINGS: HEAD CT: INTRACRANIAL CONTENTS: No acute intracranial hemorrhage. No CT evidence of acute infarct. Sequelae of mild to moderate chronic microangiopathy. Mild to moderate cerebral volume loss without hydrocephalus. No extra-axial fluid collections.  Patent basal cisterns. VISUALIZED ORBITS/SINUSES/MASTOIDS: The orbits are unremarkable. Mild paranasal sinus mucosal thickening. The temporal bone structures are well-aerated. BONES/SOFT TISSUES: The calvarium and skull base are unremarkable. CERVICAL SPINE CT: VERTEBRA: The spine is imaged from the skull base through superior endplate of T2. Straightening of the usual cervical lordosis without significant  spondylolisthesis. No evidence of acute fracture. No aggressive osseous lesion.   CANAL/FORAMINA: Multilevel degenerative changes without high-grade spinal canal stenosis or high-grade neural foraminal narrowing. PARASPINAL: No prevertebral or paravertebral soft tissue swelling.  Visualized lungs are clear. The thyroid gland is unremarkable.     IMPRESSION: HEAD CT: 1. Senescent changes and sequelae of chronic microangiopathy without acute intracranial abnormality. CERVICAL SPINE CT: 1. No acute traumatic injury of the cervical spine.     CT Cervical Spine w/o Contrast    Result Date: 12/25/2023  EXAM: CT HEAD W/O CONTRAST, CT CERVICAL SPINE W/O CONTRAST LOCATION: [facility] DATE/TIME: 12/25/2023 4:30 PM CST INDICATION: Headache and neck pain after trauma COMPARISON: CT head dated 05/18/2021 TECHNIQUE: 1) Routine CT Head without IV contrast. Multiplanar reformats. Dose reduction techniques were used. 2) Routine CT Cervical Spine without IV contrast. Multiplanar reformats. Dose reduction techniques were used. FINDINGS: HEAD CT: INTRACRANIAL CONTENTS: No acute intracranial hemorrhage. No CT evidence of acute infarct. Sequelae of mild to moderate chronic microangiopathy. Mild to moderate cerebral volume loss without hydrocephalus. No extra-axial fluid collections.  Patent basal cisterns. VISUALIZED ORBITS/SINUSES/MASTOIDS: The orbits are unremarkable. Mild paranasal sinus mucosal thickening. The temporal bone structures are well-aerated. BONES/SOFT TISSUES: The calvarium and skull base are unremarkable. CERVICAL SPINE CT: VERTEBRA: The spine is imaged from the skull base through superior endplate of T2. Straightening of the usual cervical lordosis without significant spondylolisthesis. No evidence of acute fracture. No aggressive osseous lesion.   CANAL/FORAMINA: Multilevel degenerative changes without high-grade spinal canal stenosis or high-grade neural foraminal narrowing. PARASPINAL: No  "prevertebral or paravertebral soft tissue swelling.  Visualized lungs are clear. The thyroid gland is unremarkable.     IMPRESSION: HEAD CT: 1. Senescent changes and sequelae of chronic microangiopathy without acute intracranial abnormality. CERVICAL SPINE CT: 1. No acute traumatic injury of the cervical spine.       Lab Results:  Personally Reviewed.   Fingerstick Blood Glucose: @SEDXXSG27HQN(POCGLUFGR:10)@    Last Hbg A1C: No results found for: \"HGBA1C\"   No results found for: \"INR\", \"PROTIME\"  Recent Results (from the past 24 hour(s))   Extra Blood Culture Bottle    Collection Time: 12/25/23  2:23 PM   Result Value Ref Range    Hold Specimen JIC    Extra Blue Top Tube    Collection Time: 12/25/23  2:23 PM   Result Value Ref Range    Hold Specimen JIC    Extra Red Top Tube    Collection Time: 12/25/23  2:23 PM   Result Value Ref Range    Hold Specimen JIC    Extra Green Top (Lithium Heparin) Tube    Collection Time: 12/25/23  2:23 PM   Result Value Ref Range    Hold Specimen JIC    Extra Purple Top Tube    Collection Time: 12/25/23  2:23 PM   Result Value Ref Range    Hold Specimen JIC    Basic metabolic panel    Collection Time: 12/25/23  2:23 PM   Result Value Ref Range    Sodium 138 135 - 145 mmol/L    Potassium 4.0 3.4 - 5.3 mmol/L    Chloride 101 98 - 107 mmol/L    Carbon Dioxide (CO2) 28 22 - 29 mmol/L    Anion Gap 9 7 - 15 mmol/L    Urea Nitrogen 17.1 8.0 - 23.0 mg/dL    Creatinine 0.67 0.51 - 0.95 mg/dL    GFR Estimate >90 >60 mL/min/1.73m2    Calcium 8.8 8.8 - 10.2 mg/dL    Glucose 186 (H) 70 - 99 mg/dL   CRP inflammation    Collection Time: 12/25/23  2:23 PM   Result Value Ref Range    CRP Inflammation 50.50 (H) <5.00 mg/L   CBC with platelets and differential    Collection Time: 12/25/23  2:23 PM   Result Value Ref Range    WBC Count 4.9 4.0 - 11.0 10e3/uL    RBC Count 4.08 3.80 - 5.20 10e6/uL    Hemoglobin 12.9 11.7 - 15.7 g/dL    Hematocrit 38.8 35.0 - 47.0 %    MCV 95 78 - 100 fL    MCH 31.6 26.5 - " 33.0 pg    MCHC 33.2 31.5 - 36.5 g/dL    RDW 12.9 10.0 - 15.0 %    Platelet Count 120 (L) 150 - 450 10e3/uL    % Neutrophils 90 %    % Lymphocytes 4 %    % Monocytes 6 %    % Eosinophils 0 %    % Basophils 0 %    % Immature Granulocytes 0 %    NRBCs per 100 WBC 0 <1 /100    Absolute Neutrophils 4.4 1.6 - 8.3 10e3/uL    Absolute Lymphocytes 0.2 (L) 0.8 - 5.3 10e3/uL    Absolute Monocytes 0.3 0.0 - 1.3 10e3/uL    Absolute Eosinophils 0.0 0.0 - 0.7 10e3/uL    Absolute Basophils 0.0 0.0 - 0.2 10e3/uL    Absolute Immature Granulocytes 0.0 <=0.4 10e3/uL    Absolute NRBCs 0.0 10e3/uL   Hemoglobin A1c    Collection Time: 12/25/23  2:23 PM   Result Value Ref Range    Hemoglobin A1C 5.8 (H) <5.7 %   Lactic acid whole blood    Collection Time: 12/25/23  2:24 PM   Result Value Ref Range    Lactic Acid 1.8 0.7 - 2.0 mmol/L   Blood Culture Peripheral Blood    Collection Time: 12/25/23  2:46 PM    Specimen: Peripheral Blood   Result Value Ref Range    Culture Positive on the 1st day of incubation (A)     Culture Gram positive cocci in clusters (AA)    Erythrocyte sedimentation rate auto    Collection Time: 12/25/23  2:50 PM   Result Value Ref Range    Erythrocyte Sedimentation Rate 76 (H) 0 - 30 mm/hr   Wound Aerobic Bacterial Culture Routine With Gram Stain    Collection Time: 12/25/23  4:57 PM    Specimen: Ankle, Right; Wound   Result Value Ref Range    Gram Stain Result 4+ Gram positive cocci (A)     Gram Stain Result 1+ Gram positive bacilli resembling diphtheroids (A)     Gram Stain Result No white blood cells seen (A)    Glucose by meter    Collection Time: 12/25/23 10:29 PM   Result Value Ref Range    GLUCOSE BY METER POCT 123 (H) 70 - 99 mg/dL   Basic metabolic panel    Collection Time: 12/26/23  6:24 AM   Result Value Ref Range    Sodium 141 135 - 145 mmol/L    Potassium 4.1 3.4 - 5.3 mmol/L    Chloride 107 98 - 107 mmol/L    Carbon Dioxide (CO2) 28 22 - 29 mmol/L    Anion Gap 6 (L) 7 - 15 mmol/L    Urea Nitrogen 10.5 8.0  - 23.0 mg/dL    Creatinine 0.62 0.51 - 0.95 mg/dL    GFR Estimate >90 >60 mL/min/1.73m2    Calcium 8.6 (L) 8.8 - 10.2 mg/dL    Glucose 105 (H) 70 - 99 mg/dL   CBC with platelets    Collection Time: 12/26/23  6:24 AM   Result Value Ref Range    WBC Count 3.4 (L) 4.0 - 11.0 10e3/uL    RBC Count 3.44 (L) 3.80 - 5.20 10e6/uL    Hemoglobin 10.9 (L) 11.7 - 15.7 g/dL    Hematocrit 33.0 (L) 35.0 - 47.0 %    MCV 96 78 - 100 fL    MCH 31.7 26.5 - 33.0 pg    MCHC 33.0 31.5 - 36.5 g/dL    RDW 13.0 10.0 - 15.0 %    Platelet Count 93 (L) 150 - 450 10e3/uL   CRP inflammation    Collection Time: 12/26/23  6:24 AM   Result Value Ref Range    CRP Inflammation 57.10 (H) <5.00 mg/L           Advanced Care Planning    Medical Decision Making       50 MINUTES SPENT BY ME on the date of service doing chart review, history, exam, documentation & further activities per the note.      Je Ren MD  Date: 12/26/2023  Time: 8:27 AM  [facility]

## 2023-12-26 NOTE — PLAN OF CARE
Problem: Adult Inpatient Plan of Care  Goal: Optimal Comfort and Wellbeing  Outcome: Progressing       Pt comfortable overnight and calm this morning. However family and staff are concerned that she will not tolerate MRI without meds to keep her calm. Unsure if serequil will be enough to relax her.     VSS, family at bedside.

## 2023-12-26 NOTE — PHARMACY-VANCOMYCIN DOSING SERVICE
"Pharmacy Vancomycin Initial Note  Date of Service 2023  Patient's  1960  63 year old, female    Indication: Abscess    Current estimated CrCl = Estimated Creatinine Clearance: 46.1 mL/min (based on SCr of 0.67 mg/dL).    Creatinine for last 3 days  2023:  2:23 PM Creatinine 0.67 mg/dL    Recent Vancomycin Level(s) for last 3 days  No results found for requested labs within last 3 days.      Vancomycin IV Administrations (past 72 hours)                     vancomycin (VANCOCIN) 750 mg in sodium chloride 0.9 % 250 mL intermittent infusion (mg) 750 mg New Bag 23 1718                    Nephrotoxins and other renal medications (From now, onward)      Start     Dose/Rate Route Frequency Ordered Stop    23 0600  vancomycin (VANCOCIN) 500 mg vial to attach to  mL bag         500 mg  over 1 Hours Intravenous EVERY 12 HOURS 23 1941      23 2200  piperacillin-tazobactam (ZOSYN) 3.375 g vial to attach to  mL bag        Note to Pharmacy: For [facility], [facility] and [facility]: For Zosyn-naive patients, use the \"Zosyn initial dose + extended infusion\" order panel.    3.375 g  over 240 Minutes Intravenous EVERY 8 HOURS 23 1910              Contrast Orders - past 72 hours (72h ago, onward)      None            [employer] Prediction of Planned Initial Vancomycin Regimen  Regimen: 500 mg IV every 12 hours.  Start time: 05:18 on 2023  Exposure target: AUC24 (range)400-600 mg/L.hr   AUC24,ss: 470 mg/L.hr  Probability of AUC24 > 400: 68 %  Ctrough,ss: 14.9 mg/L  Probability of Ctrough,ss > 20: 24 %  Probability of nephrotoxicity (Lodise TYSHAWN ): 10 %    Plan:  Start vancomycin  500 mg IV q12h.   Vancomycin monitoring method: AUC  Vancomycin therapeutic monitoring goal: 400-600 mg*h/L  Pharmacy will check vancomycin levels as appropriate in 1-3 Days.    Serum creatinine levels will be ordered daily for the first week of therapy and at least twice weekly for subsequent weeks.  "     Majo Castellano, LTAC, located within [facility]

## 2023-12-26 NOTE — CONSULTS
[facility] Nurse Inpatient Assessment     Consulted for: Right lateral malleolus    Patient History (according to provider note(s):      Per H+P:  63 year old female with a history of advanced dementia, rheumatoid arthritis on Remicade, and diabetes mellitus not on medications at this time who was brought to the emergency room department by family for further workup and evaluation of fever and ankle wound.  Daughter acts as primary historian.  They declined mung  given patient's advanced dementia and inability to interact with .  They state that patient fell and hit her head 4 days ago.  Since then she was sleeping more.  However  was noting increasing weakness and inability to feed herself which was unusual for her.  She developed fever and chills today with shaking.  Daughter saw patient and saw that she was draining purulent material out of her right lateral malleolus.  She has had a known wound over her right ankle for several months it had never drained.  The emergency room department patient was found to have fever concerning for complicated skin infection and she is being admitted.     Assessment:      Areas visualized during today's visit: Lower extremities     Wound location: Right lateral ankle    Last photo: 12/26  Wound due to: Unknown Etiology  Wound history/plan of care: Per chart patient's family reports it started months ago but then blackened and then started to drain just recently  Wound base: 100 % slough     Palpation of the wound bed: firm      Drainage: scant     Description of drainage: serosanguinous     Measurements (length x width x depth, in cm): 2.8  x 2.8  x  0.3 cm      Tunneling: N/A     Undermining: N/A  Periwound skin: Scar tissue      Color: normal and consistent with surrounding tissue      Temperature: normal   Odor: none  Pain: denies   Pain interventions prior to dressing change: patient tolerated well and slow and  gentle cares   Treatment goal: Increase granulation and Soften necrotic tissue  STATUS: initial assessment  Supplies ordered: gathered       Treatment Plan:     Right ankle:  1. Cleanse with wound cleanser, including lili wound skin, gently pat dry  2. Apply nickel size of medihoney to Adaptic, place on owund bed, cover with adaptic, wrap with kerlix and secure  Change daily  If supplies needed from stores, PS# [id number] - honey      Orders: Written    RECOMMEND PRIMARY TEAM ORDER: None, at this time  Education provided: Not appropriate today   Discussed plan of care with: Family and Nurse  [facility] nurse follow-up plan: weekly  Notify WOC if wound(s) deteriorate.  Nursing to notify the Provider(s) and re-consult the [facility] Nurse if new skin concern.    DATA:     Current support surface: Standard  Standard gel/foam mattress (IsoFlex, Atmos air, etc)  BMI: Body mass index is 16.45 kg/m .   Active diet order: Orders Placed This Encounter      Combination Diet Regular Diet Adult; Moderate Consistent Carb (60 g CHO per Meal) Diet     Output: I/O last 3 completed shifts:  In: 1548 [P.O.:120; I.V.:178; IV Piggyback:1250]  Out: 50 [Urine:50]     Labs:   Recent Labs   Lab 12/26/23  0624 12/25/23  1423   HGB 10.9* 12.9   WBC 3.4* 4.9   A1C  --  5.8*     Pressure injury risk assessment:   Sensory Perception: 3-->slightly limited  Moisture: 2-->very moist  Activity: 1-->bedfast  Mobility: 2-->very limited  Nutrition: 3-->adequate  Friction and Shear: 2-->potential problem  Danilo Score: 13    Senia Crawford, MADIEN, RN, PHN, HNB-BC, CWOCN  Pager no longer is use, please contact through [employer] group: [facility] [employer]      04-Feb-2021

## 2023-12-26 NOTE — H&P
"[facility]    History and Physical - Hospitalist Service       Date of Admission:  12/25/2023    Assessment & Plan      Dnoal Coker is a 63 year old female admitted on 12/25/2023 with complicated skin infection    Sepsis due to ankle infection, resolving: Diagnosis based on Temp > 38 C, HR > 90 bpm, and RR > 20 breaths/min due to infection.    --- Admit, blood cultures obtained  --- Requested wound culture  --- Given risk factors of rheumatoid arthritis, diabetes and dementia covering wound with vancomycin and Zosyn  --- Both podiatry as well as orthopedics initially consulted.  Podiatry to manage at this time.  If MRI abnormal or shows ankle joint involvement orthopedics need to be reconsulted.  Discussed with both teams  --- Podiatry following, debating debridement; ordering ABIs  --- Obtaining MRI of the ankle  ---follow    Diabetes mellitus, type II  --- Has been off medications since summer  --- No recent A1c, will recheck  --- Insulin sliding scale while here    Rheumatoid arthritis  --- On Remicade infusion    Dementia  --- She is at risk for encephalopathy.  Encephalopathy order set used at this time.    Underweight  ---BMI low - RD to monitor            Diet:  diabetic   DVT Prophylaxis: Pneumatic Compression Devices  Poole Catheter: Not present  Lines: None     Cardiac Monitoring: None  Code Status:  full code    Clinically Significant Risk Factors Present on Admission                # Thrombocytopenia: Lowest platelets = 120 in last 2 days, will monitor for bleeding        # Cachexia: Estimated body mass index is 14.63 kg/m  as calculated from the following:    Height as of this encounter: 1.473 m (4' 10\").    Weight as of this encounter: 31.8 kg (70 lb).              Disposition Plan      Expected Discharge Date: 12/27/2023      Destination: home with family;home with help/services            Ludy Pope MD  Hospitalist Service  [facility]  Securely message " with Vocera (more info)  Text page via [facility] Paging/Directory     ______________________________________________________________________    Chief Complaint   Fever, shaking chills    History was obtained from the patient's family given patient's underlying dementia    History of Present Illness   Donal Coker is a 63 year old female with a history of advanced dementia, rheumatoid arthritis on Remicade, and diabetes mellitus not on medications at this time who was brought to the emergency room department by family for further workup and evaluation of fever and ankle wound.  Daughter acts as primary historian.  They declined mun  given patient's advanced dementia and inability to interact with .  They state that patient fell and hit her head 4 days ago.  Since then she was sleeping more.  However  was noting increasing weakness and inability to feed herself which was unusual for her.  She developed fever and chills today with shaking.  Daughter saw patient and saw that she was draining purulent material out of her right lateral malleolus.  She has had a known wound over her right ankle for several months it had never drained.  The emergency room department patient was found to have fever concerning for complicated skin infection and she is being admitted.    They deny vomiting or diarrhea.  No syncope.  Usually able to walk but they endorse recent weight loss and increasing weakness.  Lives with her .      Past Medical History    Past Medical History:   Diagnosis Date    Dementia (H)     Depression     DM2 (diabetes mellitus, type 2) (H)     Hypercholesteremia     Rheumatoid aortitis        Past Surgical History   Past Surgical History:   Procedure Laterality Date     SECTION         Prior to Admission Medications   Prior to Admission Medications   Prescriptions Last Dose Informant Patient Reported? Taking?   BD ULTRA FINE PEN NEEDLES   Yes No   Sig: Use as instructed.   Blood  Glucose Monitoring Suppl (CONTOUR BLOOD GLUCOSE SYSTEM) YENI   Yes No   Sig: Use as directed   Glucose Blood (JESSI CONTOUR TEST) strip   Yes No   Sig: Test 3 times daily   adalimumab (HUMIRA *CF*) 40 MG/0.4ML pen kit Past Week at past week  Yes Yes   Sig: Inject 40 mg Subcutaneous every 14 days      Facility-Administered Medications: None        Review of Systems    Review of systems not obtained due to patient factors - language barrier and mental status     Physical Exam   Vital Signs: Temp: 98.7  F (37.1  C) Temp src: Rectal BP: 136/56 Pulse: 80   Resp: 24 SpO2: 100 % O2 Device: None (Room air)    Weight: 70 lbs 0 oz    General Appearance: Pleasant female.  Awake.  Does not like my stethoscope on her chest  Respiratory: Clear to auscultation  Cardiovascular: Regular rate and rhythm  GI: Soft and appears nontender although she does not like my palpation of her abdomen  Skin: She has ecchymosis present over the right side of her forehead and face.  She has an approximate 1-1/2 cm draining wound over her right lateral malleolus with surrounding erythema of the skin  Other: Neurologically nonfocal.  Attempts to speak to family.  Overall calm.    Medical Decision Making             Data     I have personally reviewed the following data over the past 24 hrs:    4.9  \   12.9   / 120 (L)     138 101 17.1 /  186 (H)   4.0 28 0.67 \     Procal: N/A CRP: 50.50 (H) Lactic Acid: 1.8         Imaging results reviewed over the past 24 hrs:     I also personally reviewed 2 EKGs.  Patient with regular rhythm on auscultation.  EKG use initially showing accelerated junctional and undetermined rhythm.  No acute ST changes.  Appears to have significant artifact  Recent Results (from the past 24 hour(s))   Head CT w/o contrast    Narrative    EXAM: CT HEAD W/O CONTRAST, CT CERVICAL SPINE W/O CONTRAST  LOCATION: [facility]  DATE/TIME: 12/25/2023 4:30 PM CST    INDICATION: Headache and neck pain after  trauma  COMPARISON: CT head dated 05/18/2021  TECHNIQUE:   1) Routine CT Head without IV contrast. Multiplanar reformats. Dose reduction techniques were used.  2) Routine CT Cervical Spine without IV contrast. Multiplanar reformats. Dose reduction techniques were used.    FINDINGS:  HEAD CT:   INTRACRANIAL CONTENTS: No acute intracranial hemorrhage. No CT evidence of acute infarct. Sequelae of mild to moderate chronic microangiopathy. Mild to moderate cerebral volume loss without hydrocephalus. No extra-axial fluid collections.  Patent basal   cisterns.     VISUALIZED ORBITS/SINUSES/MASTOIDS: The orbits are unremarkable. Mild paranasal sinus mucosal thickening. The temporal bone structures are well-aerated.     BONES/SOFT TISSUES: The calvarium and skull base are unremarkable.     CERVICAL SPINE CT:  VERTEBRA: The spine is imaged from the skull base through superior endplate of T2. Straightening of the usual cervical lordosis without significant spondylolisthesis. No evidence of acute fracture. No aggressive osseous lesion.      CANAL/FORAMINA: Multilevel degenerative changes without high-grade spinal canal stenosis or high-grade neural foraminal narrowing.     PARASPINAL: No prevertebral or paravertebral soft tissue swelling.  Visualized lungs are clear. The thyroid gland is unremarkable.      Impression    IMPRESSION:    HEAD CT:  1. Senescent changes and sequelae of chronic microangiopathy without acute intracranial abnormality.    CERVICAL SPINE CT:  1. No acute traumatic injury of the cervical spine.    CT Cervical Spine w/o Contrast    Narrative    EXAM: CT HEAD W/O CONTRAST, CT CERVICAL SPINE W/O CONTRAST  LOCATION: [facility]  DATE/TIME: 12/25/2023 4:30 PM CST    INDICATION: Headache and neck pain after trauma  COMPARISON: CT head dated 05/18/2021  TECHNIQUE:   1) Routine CT Head without IV contrast. Multiplanar reformats. Dose reduction techniques were used.  2) Routine CT Cervical  Spine without IV contrast. Multiplanar reformats. Dose reduction techniques were used.    FINDINGS:  HEAD CT:   INTRACRANIAL CONTENTS: No acute intracranial hemorrhage. No CT evidence of acute infarct. Sequelae of mild to moderate chronic microangiopathy. Mild to moderate cerebral volume loss without hydrocephalus. No extra-axial fluid collections.  Patent basal   cisterns.     VISUALIZED ORBITS/SINUSES/MASTOIDS: The orbits are unremarkable. Mild paranasal sinus mucosal thickening. The temporal bone structures are well-aerated.     BONES/SOFT TISSUES: The calvarium and skull base are unremarkable.     CERVICAL SPINE CT:  VERTEBRA: The spine is imaged from the skull base through superior endplate of T2. Straightening of the usual cervical lordosis without significant spondylolisthesis. No evidence of acute fracture. No aggressive osseous lesion.      CANAL/FORAMINA: Multilevel degenerative changes without high-grade spinal canal stenosis or high-grade neural foraminal narrowing.     PARASPINAL: No prevertebral or paravertebral soft tissue swelling.  Visualized lungs are clear. The thyroid gland is unremarkable.      Impression    IMPRESSION:    HEAD CT:  1. Senescent changes and sequelae of chronic microangiopathy without acute intracranial abnormality.    CERVICAL SPINE CT:  1. No acute traumatic injury of the cervical spine.    Chest XR,  PA & LAT    Narrative    EXAM: XR CHEST 2 VIEWS  LOCATION: [facility]  DATE: 12/25/2023    INDICATION: fall, pain  COMPARISON: None.      Impression    IMPRESSION: Heart is normal in size. Lungs are clear.   Ankle XR, G/E 3 views, right    Narrative    EXAM: XR ANKLE RIGHT G/E 3 VIEWS  LOCATION: [facility]  DATE: 12/25/2023    INDICATION: Pain. Wound    COMPARISON: None.      Impression    IMPRESSION: Normal joint spaces and alignment. No or acute fracture. No evidence of osseous destruction to suggest osteomyelitis, however MRI is  more sensitive if indicated. Plantar and retrocalcaneal enthesophytes.

## 2023-12-26 NOTE — PLAN OF CARE
"  Problem: Adult Inpatient Plan of Care  Goal: Absence of Hospital-Acquired Illness or Injury  Intervention: Identify and Manage Fall Risk  Recent Flowsheet Documentation  Taken 12/25/2023 1955 by Libby Davila, RN  Safety Promotion/Fall Prevention: patient and family education  Intervention: Prevent Skin Injury  Recent Flowsheet Documentation  Taken 12/25/2023 1955 by Libby Davila, RN  Body Position:   left   right   turned     Problem: Adult Inpatient Plan of Care  Goal: Absence of Hospital-Acquired Illness or Injury  Intervention: Prevent Skin Injury  Recent Flowsheet Documentation  Body Position:   left   right   turned   Goal Outcome Evaluation:    Patient unable to respond to questions. Per Daughter she sometimes responds with \"yes or no.\"  Unable to assess orientation/cognition.  Right ankle dressing CDI.  Leg elevated.  Patient appears comfortable.  Daughter does not think she is experiencing pain.  Repositioned every two hours for comfort and to prevent skin breakdown.  Pillows in use.  Daughter in with Patient.  Staying overnight.  Antibiotics - Zosyn infusing.  Purewick in place.  Continue   Temp: 98.2  F (36.8  C) Temp src: Oral BP: 103/59 Pulse: 79   Resp: 22 SpO2: 100 % O2 Device: None (Room air)            "

## 2023-12-26 NOTE — DISCHARGE INSTRUCTIONS
C DISCHARGE INSTRUCTIONS:  Right ankle:  1. Cleanse with wound cleanser, including lili wound skin, gently pat dry  2. Apply nickel size of medihoney to Adaptic, place on owund bed, cover with adaptic, wrap with kerlix and secure  Change daily  If supplies needed from stores, PS# [id number] - honey      Home Care:  [facility] [phone] will be calling you to schedule your home visit. If you have not heard from them with in 24-48 hours of your hospital discharge, please give them a call.

## 2023-12-26 NOTE — CONSULTS
"Subjective:    Pt is seen today in consult for right lateral malleolar ulcer.  They have noted a small black dot here for several months.  Started draining with pus approximately 3 days ago.  Patient became increasingly weak and fatigued over the past 3 days.  She had a temperature of 100  F.  A few days ago the patient fell.  She did hit her head.  In recent past able to walk on her own now she is not able to.  She has history of Parkinson's disease.  She has diabetes and rheumatoid arthritis.  She takes Humira for the rheumatoid arthritis.    ROS:  A 10-point review of systems was performed and is positive for that noted in the HPI and as seen below.  All other areas are negative.        No Known Allergies    Current Outpatient Medications   Medication Sig Dispense Refill    adalimumab (HUMIRA *CF*) 40 MG/0.4ML pen kit Inject 40 mg Subcutaneous every 14 days      BD ULTRA FINE PEN NEEDLES Use as instructed.      Blood Glucose Monitoring Suppl (CONTOUR BLOOD GLUCOSE SYSTEM) YENI Use as directed      Glucose Blood (JESSI CONTOUR TEST) strip Test 3 times daily         Patient Active Problem List   Diagnosis    Health Care Home    Major depressive disorder, recurrent episode (H24)    Dyspepsia    Hyperlipidemia    RA (rheumatoid arthritis) (H)    Diabetes type 2, uncontrolled    Wound infection    Cellulitis, unspecified cellulitis site       Past Medical History:   Diagnosis Date    Dementia (H)     Depression     DM2 (diabetes mellitus, type 2) (H)     Hypercholesteremia     Rheumatoid aortitis        Past Surgical History:   Procedure Laterality Date     SECTION         No family history on file.    Social History     Tobacco Use    Smoking status: Never    Smokeless tobacco: Not on file   Substance Use Topics    Alcohol use: Not on file         Exam:    Vitals: BP 92/68   Pulse 100   Temp 98.7  F (37.1  C) (Rectal)   Resp 24   Ht 1.473 m (4' 10\")   Wt 31.8 kg (70 lb)   SpO2 100%   BMI 14.63 kg/m  " "  BMI: Body mass index is 14.63 kg/m .  Height: 4' 10\"    Constitutional/ general:  Pt is in no apparent distress, appears well-nourished.  Cooperative with history and physical exam.  Patient seen today at bedside with  and female family member who acts as .    Psych:  The patient answered questions appropriately.  Normal affect.  Seems to have reasonable expectations, in terms of treatment.     Eyes:  Visual scanning/ tracking without deficit.     Ears:  Response to auditory stimuli is normal.   Auricles in proper alignment.     Lymphatic:  Popliteal lymph nodes not enlarged.     Lungs:  Non labored breathing, non labored speech. No cough.  No audible wheezing. Even, quiet breathing.       Vascular:  positive pedal pulses bilaterally for both the DP arteries.  Slight ankle edema and difficult to palpate posterior tibial arteries.  No hair growth noted.    Neuro:  Alert and oriented x 3.  Light touch appears to be intact.    Derm: Thin and shiny with no hair growth noted.    Musculoskeletal:    Normal arch.  No gross forefoot or rear foot deformities noted.  Left foot shows no signs of breakdown.  There is some slight callusing on the posterior portion of her left heel.  There is a wound over her right lateral malleoli.  The base is necrotic tissue.  It is draining.  There is erythema surrounding this.  There is no crepitus.  With palpating around the wound no obvious purulence or odor at this time.  We note with patient laying in bed left foot is straight up and right foot is off to side with pressure over lateral malleoli    Radiographic Exam:  X-Ray Findings:  I personally reviewed the films.  X-rays unremarkable     Latest Reference Range & Units Most Recent   Hemoglobin A1C <=5.6 % 8.2 (H)  5/19/21 05:40   (H): Data is abnormally high     Latest Reference Range & Units Most Recent   CRP Inflammation <5.00 mg/L 50.50 (H)  12/25/23 14:23   (H): Data is abnormally high     Latest Reference Range " & Units Most Recent   WBC 4.0 - 11.0 10e3/uL 4.9  12/25/23 14:23      Latest Reference Range & Units Most Recent   Sed Rate 0 - 30 mm/hr 76 (H)  12/25/23 14:50   (H): Data is abnormally high        A:  Diabetes mellitus   Rheumatoid arthritis, patient on  Humira  Parkinson's disease  Right lateral malleoli are decubitus ulcer with surrounding cellulitis      P: Explained the mechanics causing this pressure wound.  Will make sure pillow is under leg at all times to offload.  A culture is already been taken.  We cleaned the wound today and placed a dressing on this.  Will order foam heel protector for left to ensure this is offloaded and patient does not get left heel decubitus ulcer.  Patient is getting an MRI.  Await culture and MRI results.  It is possible patient may need debridement.  Thank you for allowing me to participate in the care of this patient.    Xavier Powers, RAFI, FACFAS

## 2023-12-26 NOTE — CONSULTS
Consultation - [facility]  Donal Coker,  1960, MRN [id number]    [facility]  Wound infection [T14.8XXA, L08.9]  Cellulitis, unspecified cellulitis site [L03.90]    PCP: Flavio Croft, [phone]   Code status:  Full Code       Extended Emergency Contact Information  Primary Emergency Contact: Lidia Coker  Address: [address] [place] [address] [place]  Mobile Phone: [phone]  Relation: Daughter  Secondary Emergency Contact: Giovani Coker  Mobile Phone: [phone]  Relation: Spouse       Assessment and Plan   Principal Problem:    Cellulitis, unspecified cellulitis site  Active Problems:    Wound infection    Impression: Right ankle wound infection, Gram stain is polymicrobial.    1 of 2 blood cultures positive for gram-positive cocci in clusters, genotypic analysis consistent with methicillin susceptible Staph aureus    Immunocompromised patient on long-term Humira.    Alzheimer's type dementia.    Recommendations: Previous current antimicrobial chemotherapy of vancomycin and Zosyn pending culture results.    Agree with plans for MRI of the ankle.    Continue daily surveillance blood cultures to document clearance of bacteremia.    Nothing further to offer from infectious disease standpoint pending these above studies.    Further recommendations to follow clinical course.    Thank you for consulting [facility]    Jose Lindsay MD  [phone]     Chief Complaint Cellulitis, unspecified cellulitis site       HPI   We have been requested by Sunny Ren MD  to evaluate Donal Coker for bacteremia and wound infection who is a 63 year old year old female.    Patient is a 63-year-old woman with complex past medical history as below.  She presented to the emergency department yesterday with right ankle swelling and a black spot on the ankle.    She was admitted to the hospital for further  evaluation management.  1 of 2 blood cultures obtained are growing gram-positive cocci in clusters, with genotypic analysis consistent with methicillin susceptible Staphylococcus aureus.    Wound Gram stain from the right ankle showing both gram-positive cocci and gram-negative bacilli.    Patient has Alzheimer's dementia and is not a very good historian.  Per her daughter who is at the bedside, patient has been losing weight fairly rapidly.  She mostly sits in the sofa or lies in bed, but she was up walking last week.     Medical History  Patient Active Problem List   Diagnosis    Health Care Home    Major depressive disorder, recurrent episode (H24)    Dyspepsia    Hyperlipidemia    RA (rheumatoid arthritis) (H)    Diabetes type 2, uncontrolled    Wound infection    Cellulitis, unspecified cellulitis site     Past Medical History:   Diagnosis Date    Dementia (H)     Depression     DM2 (diabetes mellitus, type 2) (H)     Hypercholesteremia     Rheumatoid aortitis     Surgical History  She  has a past surgical history that includes  section.   Social History  Reviewed, and she  reports that she has never smoked. She does not have any smokeless tobacco history on file. She reports that she does not currently use alcohol. She reports that she does not use drugs.   Allergies  No Known Allergies Family History  Noncontributory to current problem except as mentioned above    Psychosocial Needs  Social History     Social History Narrative    Lives with her .     Additional psychosocial needs reviewed per nursing assessment.     Prior to Admission Medications   Medications Prior to Admission   Medication Sig Dispense Refill Last Dose    adalimumab (HUMIRA *CF*) 40 MG/0.4ML pen kit Inject 40 mg Subcutaneous every 14 days   Past Week at past week    BD ULTRA FINE PEN NEEDLES Use as instructed.       Blood Glucose Monitoring Suppl (CONTOUR BLOOD GLUCOSE SYSTEM) YENI Use as directed       Glucose Blood (JESSI  "CONTOUR TEST) strip Test 3 times daily             Review of Systems:  Review of systems not obtained due to patient factors.  However some review of systems is obtained from her daughter at the bedside , otherwise all others negative. Physical Exam:  Temp:  [98.1  F (36.7  C)-102.3  F (39.1  C)] 98.3  F (36.8  C)  Pulse:  [] 72  Resp:  [12-26] 18  BP: ()/(49-94) 105/61  SpO2:  [93 %-100 %] 98 %    /61 (BP Location: Left arm)   Pulse 72   Temp 98.3  F (36.8  C) (Axillary)   Resp 18   Ht 1.473 m (4' 10\")   Wt 34 kg (74 lb 15.3 oz)   SpO2 98%   BMI 15.67 kg/m    General appearance: alert, appears stated age, cachectic, and slowed mentation  Head: Normocephalic, without obvious abnormality, atraumatic  Eyes: Extraocular muscles intact, no icterus  Neck: no adenopathy and supple, symmetrical, trachea midline  Lungs: clear to auscultation bilaterally  Heart: Regular rate and rhythm, no murmur  Abdomen: soft, non-tender; bowel sounds normal; no masses,  no organomegaly  Extremities: Prominent muscle wasting.  Photographs reviewed from yesterday and today showing necrotic appearing wound on the right lateral malleolus.  Skin: Skin color, texture, turgor normal. No rashes or lesions  Neurologic: Mental status: Alert and looks at me but not really interactive       Pertinent Labs  Lab Results: personally reviewed.   WBC   Date/Time Value Ref Range Status   04/28/2018 05:01 PM 6.4 4.0 - 11.0 thou/uL Final     WBC Count   Date/Time Value Ref Range Status   12/26/2023 06:24 AM 3.4 (L) 4.0 - 11.0 10e3/uL Final   12/25/2023 02:23 PM 4.9 4.0 - 11.0 10e3/uL Final     Hemoglobin   Date/Time Value Ref Range Status   12/26/2023 06:24 AM 10.9 (L) 11.7 - 15.7 g/dL Final   12/25/2023 02:23 PM 12.9 11.7 - 15.7 g/dL Final   04/28/2018 05:01 PM 12.4 12.0 - 16.0 g/dL Final   06/19/2012 03:46 PM 13.0 11.7 - 15.7 g/dl Final   07/19/2011 10:03 AM 12.8 11.7 - 15.7 g/dl Final     Hematocrit   Date/Time Value Ref Range " Status   12/26/2023 06:24 AM 33.0 (L) 35.0 - 47.0 % Final   12/25/2023 02:23 PM 38.8 35.0 - 47.0 % Final   04/28/2018 05:01 PM 38.5 35.0 - 47.0 % Final   06/19/2012 03:46 PM 39.5 35.0 - 47.0 % Final   07/19/2011 10:03 AM 39.8 35.0 - 47.0 % Final     Platelet Count   Date/Time Value Ref Range Status   12/26/2023 06:24 AM 93 (L) 150 - 450 10e3/uL Final   12/25/2023 02:23  (L) 150 - 450 10e3/uL Final   04/28/2018 05:01  140 - 440 thou/uL Final        Sodium   Date/Time Value Ref Range Status   12/26/2023 06:24  135 - 145 mmol/L Final     Comment:     Reference intervals for this test were updated on 09/26/2023 to more accurately reflect our healthy population. There may be differences in the flagging of prior results with similar values performed with this method. Interpretation of those prior results can be made in the context of the updated reference intervals.    12/25/2023 02:23  135 - 145 mmol/L Final     Comment:     Reference intervals for this test were updated on 09/26/2023 to more accurately reflect our healthy population. There may be differences in the flagging of prior results with similar values performed with this method. Interpretation of those prior results can be made in the context of the updated reference intervals.    05/18/2021 08:38  136 - 145 mmol/L Final   09/20/2012 05:03  136 - 145 mmol/L Final   06/19/2012 03:46  136 - 145 mmol/L Final   07/19/2011 10:24  136 - 145 mmol/L Final     Carbon Dioxide (CO2)   Date/Time Value Ref Range Status   12/26/2023 06:24 AM 28 22 - 29 mmol/L Final   12/25/2023 02:23 PM 28 22 - 29 mmol/L Final   05/18/2021 08:38 PM 26 22 - 31 mmol/L Final   04/28/2018 05:01 PM 28 22 - 31 mmol/L Final     Urea Nitrogen   Date/Time Value Ref Range Status   12/26/2023 06:24 AM 10.5 8.0 - 23.0 mg/dL Final   12/25/2023 02:23 PM 17.1 8.0 - 23.0 mg/dL Final   05/18/2021 08:38 PM 16 8 - 22 mg/dL Final   04/28/2018 05:01 PM 10 8 - 22  mg/dL Final   09/20/2012 05:03 PM 15 8 - 22 mg/dL Final   06/19/2012 03:46 PM 11 8 - 22 mg/dL Final   07/19/2011 10:24 AM 9 8 - 22 mg/dL Final     Creatinine   Date Value Ref Range Status   12/26/2023 0.62 0.51 - 0.95 mg/dL Final   09/20/2012 0.71 0.60 - 1.10 mg/dL Final     7-Day Micro Results       Collected Updated Procedure Result Status      12/26/2023 1002 12/26/2023 1009 Blood Culture Arm, Right [[id number]]   Blood from Arm, Right    In process Component Value   No component results               12/25/2023 1657 12/26/2023 1313 Wound Aerobic Bacterial Culture Routine With Gram Stain [[id number]]   (Abnormal)   Wound from Ankle, Right    Preliminary result Component Value   Culture Culture in progress  [P]     2+ Gram negative bacilli  [P]    Gram Stain Result 4+ Gram positive cocci  [P]     1+ Gram positive bacilli resembling diphtheroids  [P]     No white blood cells seen  [P]                12/25/2023 1446 12/26/2023 0754 Blood Culture Peripheral Blood [[id number]]   (Abnormal)   Peripheral Blood    Preliminary result Component Value   Culture Positive on the 1st day of incubation  [P]     Gram positive cocci in clusters  [P]     2 of 2 bottles               12/25/2023 1446 12/25/2023 1452 Blood Culture Peripheral Blood [[id number]]   Peripheral Blood    In process Component Value   No component results               12/25/2023 1446 12/26/2023 1055 Verigene GP Panel [[id number]]    (Abnormal)   Peripheral Blood    Final result Component Value   Staphylococcus aureus Detected   Positive for Staphylococcus aureus and negative for the mecA gene (not resistant to methicillin) by Laguoigene multiplex nucleic acid test. Final identification and antimicrobial susceptibility testing will be verified by standard methods.   Staphylococcus epidermidis Not Detected   Staphylococcus lugdunensis Not Detected   Enterococcus faecalis Not Detected   Enterococcus faecium Not Detected   Streptococcus species Not  Detected   Streptococcus agalactiae Not Detected   Streptococcus anginosus group Not Detected   Streptococcus pneumoniae Not Detected   Streptococcus pyogenes Not Detected   Listeria species Not Detected                       Pertinent Radiology  Radiology Results:     US Lower Extremity Arterial Duplex Bilateral    Result Date: 12/25/2023  EXAM: US LOWER EXTREMITY ARTERIAL DUPLEX BILATERAL LOCATION: [facility] DATE: 12/25/2023 INDICATION: right lower exttremity infection, sepsis, difficult to palpate posterior tibial artery COMPARISON: None. TECHNIQUE: Duplex utilizing 2D gray-scale imaging, Doppler interrogation with color-flow and spectral waveform analysis. FINDINGS: RIGHT- Significant calcified and noncalcified atherosclerosis seen throughout the right lower extremity. Largely multiphasic waveforms to the level of the knee. The posterior tibial, anterior tibial and dorsalis pedis arteries are all patent at the level  of the ankle, although posterior tibial and anterior tibial arteries demonstrate monophasic waveforms. No evidence of distinct critical stenosis or occlusion. LEFT- Significant calcified and noncalcified atherosclerosis. Normal multiphasic waveforms throughout the left lower extremity to the level of the ankle. No evidence of critical stenosis or occlusion. RIGHT LOWER EXTREMITY ARTERIAL ASSESSMENT: External iliac artery 132 cm/s Common femoral artery: 71 cm/s Profunda femoris artery: 62 cm/s SFA (proximal): 104 cm/s SFA (mid): 109 cm/s SFA (distal): 161 cm/s Popliteal artery (proximal): 153 cm/s Popliteal artery (distal): 78 cm/s Posterior tibial artery: 54 cm/s Anterior tibial artery: 87 cm/s Dorsalis pedis artery: 82 cm/s LEFT LOWER EXTREMITY ARTERIAL ASSESSMENT: External iliac artery 105 cm/s Common femoral artery: 96 cm/s Profunda femoris artery: 111 cm/s SFA (proximal): 97 cm/s SFA (mid): 115 cm/s SFA (distal): 109 cm/s Popliteal artery (proximal): 57 cm/s Popliteal  artery (distal): 67 cm/s Posterior tibial artery: 56 cm/s Anterior tibial artery: 81 cm/s Dorsalis pedis artery: 21 cm/s     IMPRESSION: 1.  Significant calcified and noncalcified atherosclerosis throughout the bilateral lower extremities, right slightly greater than left. No evidence of critical stenosis or occlusion bilaterally.     Chest XR,  PA & LAT    Result Date: 12/25/2023  EXAM: XR CHEST 2 VIEWS LOCATION: [facility] DATE: 12/25/2023 INDICATION: fall, pain COMPARISON: None.     IMPRESSION: Heart is normal in size. Lungs are clear.    Ankle XR, G/E 3 views, right    Result Date: 12/25/2023  EXAM: XR ANKLE RIGHT G/E 3 VIEWS LOCATION: [facility] DATE: 12/25/2023 INDICATION: Pain. Wound COMPARISON: None.     IMPRESSION: Normal joint spaces and alignment. No or acute fracture. No evidence of osseous destruction to suggest osteomyelitis, however MRI is more sensitive if indicated. Plantar and retrocalcaneal enthesophytes.    Head CT w/o contrast    Result Date: 12/25/2023  EXAM: CT HEAD W/O CONTRAST, CT CERVICAL SPINE W/O CONTRAST LOCATION: [facility] DATE/TIME: 12/25/2023 4:30 PM CST INDICATION: Headache and neck pain after trauma COMPARISON: CT head dated 05/18/2021 TECHNIQUE: 1) Routine CT Head without IV contrast. Multiplanar reformats. Dose reduction techniques were used. 2) Routine CT Cervical Spine without IV contrast. Multiplanar reformats. Dose reduction techniques were used. FINDINGS: HEAD CT: INTRACRANIAL CONTENTS: No acute intracranial hemorrhage. No CT evidence of acute infarct. Sequelae of mild to moderate chronic microangiopathy. Mild to moderate cerebral volume loss without hydrocephalus. No extra-axial fluid collections.  Patent basal cisterns. VISUALIZED ORBITS/SINUSES/MASTOIDS: The orbits are unremarkable. Mild paranasal sinus mucosal thickening. The temporal bone structures are well-aerated. BONES/SOFT TISSUES: The  calvarium and skull base are unremarkable. CERVICAL SPINE CT: VERTEBRA: The spine is imaged from the skull base through superior endplate of T2. Straightening of the usual cervical lordosis without significant spondylolisthesis. No evidence of acute fracture. No aggressive osseous lesion.   CANAL/FORAMINA: Multilevel degenerative changes without high-grade spinal canal stenosis or high-grade neural foraminal narrowing. PARASPINAL: No prevertebral or paravertebral soft tissue swelling.  Visualized lungs are clear. The thyroid gland is unremarkable.     IMPRESSION: HEAD CT: 1. Senescent changes and sequelae of chronic microangiopathy without acute intracranial abnormality. CERVICAL SPINE CT: 1. No acute traumatic injury of the cervical spine.     CT Cervical Spine w/o Contrast    Result Date: 12/25/2023  EXAM: CT HEAD W/O CONTRAST, CT CERVICAL SPINE W/O CONTRAST LOCATION: [facility] DATE/TIME: 12/25/2023 4:30 PM CST INDICATION: Headache and neck pain after trauma COMPARISON: CT head dated 05/18/2021 TECHNIQUE: 1) Routine CT Head without IV contrast. Multiplanar reformats. Dose reduction techniques were used. 2) Routine CT Cervical Spine without IV contrast. Multiplanar reformats. Dose reduction techniques were used. FINDINGS: HEAD CT: INTRACRANIAL CONTENTS: No acute intracranial hemorrhage. No CT evidence of acute infarct. Sequelae of mild to moderate chronic microangiopathy. Mild to moderate cerebral volume loss without hydrocephalus. No extra-axial fluid collections.  Patent basal cisterns. VISUALIZED ORBITS/SINUSES/MASTOIDS: The orbits are unremarkable. Mild paranasal sinus mucosal thickening. The temporal bone structures are well-aerated. BONES/SOFT TISSUES: The calvarium and skull base are unremarkable. CERVICAL SPINE CT: VERTEBRA: The spine is imaged from the skull base through superior endplate of T2. Straightening of the usual cervical lordosis without significant spondylolisthesis. No  evidence of acute fracture. No aggressive osseous lesion.   CANAL/FORAMINA: Multilevel degenerative changes without high-grade spinal canal stenosis or high-grade neural foraminal narrowing. PARASPINAL: No prevertebral or paravertebral soft tissue swelling.  Visualized lungs are clear. The thyroid gland is unremarkable.     IMPRESSION: HEAD CT: 1. Senescent changes and sequelae of chronic microangiopathy without acute intracranial abnormality. CERVICAL SPINE CT: 1. No acute traumatic injury of the cervical spine.

## 2023-12-26 NOTE — PLAN OF CARE
Goal Outcome Evaluation:      Plan of Care Reviewed With: patient, family  Patient  on a cart to MRI. Seroquel 25 mg given prior.  Temp 101.1 Axillary tylenol supp given.   1900: Patient returned from MRI and US. Awaiting for result.

## 2023-12-26 NOTE — PROGRESS NOTES
"Care Management Follow Up    Length of Stay (days): 1    Expected Discharge Date: 12/28/2023     Concerns to be Addressed: diagnostic work up, IV antibiotics, PT eval      Patient plan of care discussed at interdisciplinary rounds: Yes    Anticipated Discharge Disposition:  home     Anticipated Discharge Services:  TBD    Anticipated Discharge DME:  TBD      Additional Information:  Patient admitted with complicated skin infection and now with bacteremia.  ID consulted, currently on Vanco and Zosyn, cultures pending, diagnostic work up. On room air.    Request for PT eval.       Social History:  Donal lives in a house with her  and children. She has PCA help from family for all ADLs and IADLs. Needs assist of one.   Per family, \"She has a dementia diagnosis. She is not able to communicate because of the dementia.\"  Family to transport at discharge.       12/26/23:  Per bedside nurse patient is assist of 2. Request for PT eval. Final discharge plan pending progression and recommendations.           Gina Contreras RN      "

## 2023-12-26 NOTE — CONSULTS
"Care Management Initial Consult    General Information  Assessment completed with: Patient, Children, Donal and daughter  Type of CM/SW Visit: Initial Assessment    Primary Care Provider verified and updated as needed: Yes   Readmission within the last 30 days: no previous admission in last 30 days      Reason for Consult: discharge planning  Advance Care Planning: Advance Care Planning Reviewed: no concerns identified          Communication Assessment  Patient's communication style: spoken language (non-English) (speaks Hmong)             Cognitive  Cognitive/Neuro/Behavioral:    per family, \"She has a dementia diagnosis. She is not able to communicate because of the dementia.\"            Living Environment:   People in home: child(alida), adult, spouse     Current living Arrangements: house      Able to return to prior arrangements: yes       Family/Social Support:  Care provided by: child(alida), other (see comments), self (\"PCA\")  Provides care for: no one, unable/limited ability to care for self  Marital Status:   PCA, Children,   Neng       Description of Support System: Supportive, Involved    Support Assessment: Adequate family and caregiver support, Adequate social supports    Current Resources:   Patient receiving home care services: No     Community Resources: PCA  Equipment currently used at home: glucometer  Supplies currently used at home: Diabetic Supplies, Incontinence Supplies    Employment/Financial:  Employment Status: disabled     Employment/ Comments: \"no  history\"  Financial Concerns:     Referral to Financial Worker: No       Does the patient's insurance plan have a 3 day qualifying hospital stay waiver?  No      Functional Status:  Prior to admission patient needed assistance:   Dependent ADLs:: Bathing, Dressing, Grooming, Incontinence, Positioning, Transfers, Toileting  Dependent IADLs:: Cleaning, Cooking, Laundry, Shopping, Meal Preparation, Medication Management, " "Money Management, Transportation, Incontinence       Mental Health Status:          Chemical Dependency Status:                Values/Beliefs:  Spiritual, Cultural Beliefs, Worship Practices, Values that affect care:                 Additional Information:  Donal lives in a house with her  and children. She has PCA help from family for all ADLs and IADLs.    Per family, \"She has a dementia diagnosis. She is not able to communicate because of the dementia.\"    Unknown discharge needs at this time.     Family to transport at discharge.    CM to follow for medical progression of care, discharge recommendations, and final discharge plan.    Malu Simon RN    "

## 2023-12-26 NOTE — ED NOTES
"[facility] ED Handoff Report    ED Chief Complaint: Wound infection of right ankle    ED Diagnosis:  (T14.8XXA,  L08.9) Wound infection  Comment: Pediatrist consulted.   Plan: elevate leg    (L03.90) Cellulitis, unspecified cellulitis site  Comment:   Plan:        PMH:    Past Medical History:   Diagnosis Date    Dementia (H)     Depression     DM2 (diabetes mellitus, type 2) (H)     Hypercholesteremia     Rheumatoid aortitis         Code Status:  No Order     Falls Risk: Yes Band: Applied    Current Living Situation/Residence: lives with a significant other     Elimination Status: Continent: No Pure wick appied    Activity Level: 2 assist    Patients Preferred Language:  Other: Hmong - daughter translates     Needed: Yes    Vital Signs:  BP 92/68   Pulse 100   Temp 98.7  F (37.1  C) (Rectal)   Resp 24   Ht 1.473 m (4' 10\")   Wt 31.8 kg (70 lb)   SpO2 100%   BMI 14.63 kg/m       Cardiac Rhythm: Afib with frequent PVC's    Pain Score: 0/10    Is the Patient Confused:  Yes severe dementia    Last Food or Drink: 12/25/23 at 0900    Focused Assessment:      Per patient's family members, reports patient has \"dry skin\" and callus on the right lower lateral aspect of right foot that initially appeared black for several months. Mentions wound started to bleed and drain pus ~3 days ago (12/22). She has been increasingly weak and fatigue the past 3 days. She had a fever of 100 F. She has been rapidly unintenionally losing weight and declining. She has been increasingly weak, fatige, prone to fall. A few days ago, patient fell two times, one instance was when she was slumped on the side of the bed. She did hit the front and posterior aspect of her head, and was not evaluated then. At baseline, patient was able to walk slowly on her own, but the since this past WKND, she hasn't been able to stand without assistance. Patient has Parkinson's disease and can't communicate or express when in pain. She has " a hx of DM and RA, was recently taken of metformin, and only daily medicaion is Humira for RA. No cardiac history. Patient's BG hasn't been checked regularly. No other reported complaints or concerns at this time.     Tests Performed: Done: Labs and Imaging    Treatments Provided:  ABX, Tylenol, wound care, fluids    Family Dynamics/Concerns: No    Family Updated On Visitor Policy: Yes    Plan of Care Communicated to Family: Yes    Who Was Updated about Plan of Care: Pt's daughter and  at bedside    Belongings Checklist Done and Signed by Patient: No    Medications sent with patient: N/A    Covid: asymptomatic , N/A    Additional Information: Report called to GREGORY Souza on P3    RN: Alma Delia Coleman RN   12/25/2023 6:32 PM

## 2023-12-26 NOTE — PLAN OF CARE
Goal Outcome Evaluation:      Plan of Care Reviewed With: patient, family    Problem: Sepsis/Septic Shock  Goal: Blood Glucose Level Within Targeted Range  Outcome: Progressing   Goal: Absence of Infection Signs and Symptoms  Outcome: Progressing    Patient is confused from advanced dementia. Hmong speaking, family at bedside helping with cares.  No fever noted. Right ankle wound tender to touch. Dressing changed by [facility] nurse.  2 assist with cares and reposition.  Pure wick in place but incontinent pad soaked with urine., complete bed changed.   Good appetite and fluid intake per family.  Patient feeding by family with home meals.  Called from Microbiology of positive blood cultures for gram positive with cocci cluster and also detected staph aureus from 12/25 at 1446.  MD notified and ID consulted.

## 2023-12-27 ENCOUNTER — APPOINTMENT (OUTPATIENT)
Dept: PHYSICAL THERAPY | Facility: HOSPITAL | Age: 63
DRG: 603 | End: 2023-12-27
Attending: EMERGENCY MEDICINE
Payer: MEDICARE

## 2023-12-27 LAB
CREAT SERPL-MCNC: 0.65 MG/DL (ref 0.51–0.95)
EGFRCR SERPLBLD CKD-EPI 2021: >90 ML/MIN/1.73M2
ERYTHROCYTE [DISTWIDTH] IN BLOOD BY AUTOMATED COUNT: 12.8 % (ref 10–15)
GLUCOSE BLDC GLUCOMTR-MCNC: 121 MG/DL (ref 70–99)
GLUCOSE BLDC GLUCOMTR-MCNC: 142 MG/DL (ref 70–99)
GLUCOSE BLDC GLUCOMTR-MCNC: 167 MG/DL (ref 70–99)
GLUCOSE BLDC GLUCOMTR-MCNC: 191 MG/DL (ref 70–99)
GLUCOSE BLDC GLUCOMTR-MCNC: 95 MG/DL (ref 70–99)
HCT VFR BLD AUTO: 34.5 % (ref 35–47)
HGB BLD-MCNC: 11.3 G/DL (ref 11.7–15.7)
MCH RBC QN AUTO: 31.8 PG (ref 26.5–33)
MCHC RBC AUTO-ENTMCNC: 32.8 G/DL (ref 31.5–36.5)
MCV RBC AUTO: 97 FL (ref 78–100)
PLATELET # BLD AUTO: 108 10E3/UL (ref 150–450)
RBC # BLD AUTO: 3.55 10E6/UL (ref 3.8–5.2)
WBC # BLD AUTO: 5 10E3/UL (ref 4–11)

## 2023-12-27 PROCEDURE — 82565 ASSAY OF CREATININE: CPT | Performed by: FAMILY MEDICINE

## 2023-12-27 PROCEDURE — 85027 COMPLETE CBC AUTOMATED: CPT | Performed by: EMERGENCY MEDICINE

## 2023-12-27 PROCEDURE — 97161 PT EVAL LOW COMPLEX 20 MIN: CPT | Mod: GP

## 2023-12-27 PROCEDURE — 36415 COLL VENOUS BLD VENIPUNCTURE: CPT

## 2023-12-27 PROCEDURE — 250N000011 HC RX IP 250 OP 636: Performed by: FAMILY MEDICINE

## 2023-12-27 PROCEDURE — 99232 SBSQ HOSP IP/OBS MODERATE 35: CPT | Performed by: EMERGENCY MEDICINE

## 2023-12-27 PROCEDURE — 99232 SBSQ HOSP IP/OBS MODERATE 35: CPT | Performed by: INTERNAL MEDICINE

## 2023-12-27 PROCEDURE — 99232 SBSQ HOSP IP/OBS MODERATE 35: CPT | Performed by: PODIATRIST

## 2023-12-27 PROCEDURE — 36415 COLL VENOUS BLD VENIPUNCTURE: CPT | Performed by: EMERGENCY MEDICINE

## 2023-12-27 PROCEDURE — 250N000013 HC RX MED GY IP 250 OP 250 PS 637: Performed by: FAMILY MEDICINE

## 2023-12-27 PROCEDURE — 87040 BLOOD CULTURE FOR BACTERIA: CPT

## 2023-12-27 PROCEDURE — 120N000001 HC R&B MED SURG/OB

## 2023-12-27 RX ADMIN — SENNOSIDES AND DOCUSATE SODIUM 1 TABLET: 8.6; 5 TABLET ORAL at 08:22

## 2023-12-27 RX ADMIN — VANCOMYCIN HYDROCHLORIDE 500 MG: 500 INJECTION, POWDER, LYOPHILIZED, FOR SOLUTION INTRAVENOUS at 06:36

## 2023-12-27 RX ADMIN — INSULIN ASPART 1 UNITS: 100 INJECTION, SOLUTION INTRAVENOUS; SUBCUTANEOUS at 16:31

## 2023-12-27 RX ADMIN — INSULIN ASPART 1 UNITS: 100 INJECTION, SOLUTION INTRAVENOUS; SUBCUTANEOUS at 12:38

## 2023-12-27 RX ADMIN — PIPERACILLIN AND TAZOBACTAM 3.38 G: 3; .375 INJECTION, POWDER, FOR SOLUTION INTRAVENOUS at 11:01

## 2023-12-27 RX ADMIN — PIPERACILLIN AND TAZOBACTAM 3.38 G: 3; .375 INJECTION, POWDER, FOR SOLUTION INTRAVENOUS at 02:28

## 2023-12-27 RX ADMIN — PIPERACILLIN AND TAZOBACTAM 3.38 G: 3; .375 INJECTION, POWDER, FOR SOLUTION INTRAVENOUS at 19:41

## 2023-12-27 RX ADMIN — QUETIAPINE FUMARATE 25 MG: 25 TABLET ORAL at 11:01

## 2023-12-27 ASSESSMENT — ACTIVITIES OF DAILY LIVING (ADL)
ADLS_ACUITY_SCORE: 56
ADLS_ACUITY_SCORE: 58
ADLS_ACUITY_SCORE: 56
ADLS_ACUITY_SCORE: 58
ADLS_ACUITY_SCORE: 58
ADLS_ACUITY_SCORE: 56
ADLS_ACUITY_SCORE: 56
ADLS_ACUITY_SCORE: 58

## 2023-12-27 NOTE — PROGRESS NOTES
[facility]     Patient is anticipated to discharge 12/28/23.  Patient agrees to have [facility] provide SN Wound Care services. Patient will receive a phone call from [facility] to schedule an initial home care visit. Anticipated start of care date is [within 24-48 hours of discharge].     Please reach out to Clinical Liaison with questions.     MELONY Esteban, LPN  [facility] Liaison   Cell: [phone]

## 2023-12-27 NOTE — PROGRESS NOTES
"Care Management Follow Up    Length of Stay (days): 2    Expected Discharge Date: 12/28/2023       Concerns to be Addressed: diagnostic work up, IV antibiotics       Patient plan of care discussed at interdisciplinary rounds: Yes     Anticipated Discharge Disposition:  home     Anticipated Discharge Services:  TBD     Anticipated Discharge DME:  TBD        Additional Information:  Patient admitted with complicated skin infection and now with bacteremia.  ID consulted, currently IV Zosyn, cultures pending, diagnostic work up. On room air.  WOC: daily wound care recommended to right lateral malleolus.      Therapy: Patient is assist of one with need for 24/7 care.      Social History:  Donal lives in a house with her  and children. She has PCA help from family for all ADLs and IADLs. Needs assist of one.   Per family, \"She has a dementia diagnosis. She is not able to communicate because of the dementia.\"  Family to transport at discharge.        12/27/23:  Referral made to [facility] [place] for RN to assess wound and teach family wound cares. Will wait to see if able to secure home care and then discuss with family.  Patient will be returning home at discharge.         Gina Contreras RN     "

## 2023-12-27 NOTE — PROGRESS NOTES
12/27/23 0930   Appointment Info   Signing Clinician's Name / Credentials (PT) Shannon Warren PT   Rehab Comments (PT) Patient in bed .Left up with spouse .Chair alarm on and call light in reach.       Present no   Language Hmong-spouse interpreted   Living Environment   People in Home child(alida), adult;spouse   Current Living Arrangements house   Transportation Anticipated family or friend will provide   Self-Care   Usual Activity Tolerance poor   Current Activity Tolerance poor   Equipment Currently Used at Home wheelchair, manual   Fall history within last six months yes   Activity/Exercise/Self-Care Comment Patient needed assist with all ADL and IADL.was walking until recently bu tfalling a lot per spouse.Per care manager pt has a PCA ,spouse denied.   General Information   Onset of Illness/Injury or Date of Surgery 12/25/23   Referring Physician Mikal Correa   Patient/Family Therapy Goals Statement (PT) did not state   Pertinent History of Current Problem (include personal factors and/or comorbidities that impact the POC) Admitted with weakness,confusion ,R lat ankle wound.Patienr has ahx of dementia/Parkinson's.   Existing Precautions/Restrictions fall   Weight-Bearing Status - LLE weight-bearing as tolerated   Weight-Bearing Status - RLE weight-bearing as tolerated   Cognition   Affect/Mental Status (Cognition) flat/blunted affect   Orientation Status (Cognition) unable/difficult to assess   Follows Commands (Cognition) does not follow one-step commands   Pain Assessment   Patient Currently in Pain Yes, see Vital Sign flowsheet   Range of Motion (ROM)   Range of Motion ROM is WFL   Strength (Manual Muscle Testing)   Strength (Manual Muscle Testing) Deficits observed during functional mobility   Bed Mobility   Supine-Sit [place] (Bed Mobility) moderate assist (50% patient effort)   Bed Mobility Limitations cognitive deficits;decreased ability to use arms for  "pushing/pulling;decreased ability to use legs for bridging/pushing   Impairments Contributing to Impaired Bed Mobility decreased strength   Assistive Device (Bed Mobility) bed rails   Comment, (Bed Mobility) Patient is a \"grabber\"   Bed-Chair Transfer   Bed-Chair [place] (Transfers) maximum assist (25% patient effort)   Assistive Device (Bed-Chair Transfers) other (see comments)  (none-therapist in front)   Comment, (Bed-Chair Transfer) pt had difficulty comprehending instructions for turning   Gait/Stairs (Locomotion)   Distance in Feet (Gait) unable   Balance   Balance other (describe)   Clinical Impression   Criteria for Skilled Therapeutic Intervention Evaluation only   Influenced by the following impairments cognition   Clinical Presentation (PT Evaluation Complexity) evolving   Clinical Presentation Rationale pt presents as med diagnosed   Clinical Decision Making (Complexity) moderate complexity   Risk & Benefits of therapy have been explained care plan/treatment goals reviewed;current/potential barriers reviewed;spouse/significant other   Clinical Impression Comments Patient is a 62 yo Hmong speaking female with hx of dementia/Parkinsons presents with weakness and confusion,falls at home.Presents mobility deficits ,needed assist with ADL and IADL at home prior .Not appropriate for skilled PT due to advanced dementia,poor comprehention and ability to participate in PT.   PT Total Evaluation Time   PT Eval, Low Complexity Minutes (09227) 20   Physical Therapy Goals   PT Frequency One time eval and treatment only   PT Predicted Duration/Target Date for Goal Attainment 12/27/23   PT Discharge Planning   PT Plan d/c PT-poor rehab potential,lack of comprehension to participate in skilled PT   PT Discharge Recommendation (DC Rec) home with assist  (home care)   PT Rationale for DC Rec Patient needs assist of 1 for mobility -will need 24 hrs cares from family   PT Brief overview of current status Max/mod assist " of 1 for bed mobility and bed -chaiir pivot   PT Equipment Needed at Discharge wheelchair   Total Session Time   Total Session Time (sum of timed and untimed services) 20

## 2023-12-27 NOTE — PROGRESS NOTES
Daily Progress Note    Assessment/Plan:  Donal Coker is a 63 year old female admitted with complicated skin infection and now with bacteremia     Sepsis due to right ankle infection, blood cultures now with gram-positive cocci and gram positive bacilli.sepsis diagnosis based on Temp > 38 C, HR > 90 bpm, and RR > 20 breaths/min due to infection.  Continue Zosyn and vancomycin while awaiting final results  -ID following, checking daily blood cultures.  --- Requested wound culture  -MRI showed no evidence of osteomyelitis, septic arthritis or soft tissue abscess.  -Podiatry following  -Seen by wound care nurse  -Given advanced dementia no significant workup with MELIDA recommended nor PICC line.  Depending on culture results may use sulfa     Diabetes mellitus, type II  --- Has been off medications since summer  --- A1c on admission was 5.8  --- Insulin sliding scale while here     Rheumatoid arthritis  --- On Remicade infusion     Dementia  --- She is at risk for encephalopathy.  Encephalopathy order set initiated    Code status:Full Code        Barriers to Discharge: Culture results    Disposition: Anticipate discharge home with assist in 1 to 2 days    Subjective:  Donal is not able to provide much information secondary to dementia and language barrier.  I called and spoke with her daughter, her  was also present.  She has appeared stable and pretty much her normal baseline self.  There have been no fevers or chills.  No indication of pain.        Current Medications Reviewed via EHR List    Objective:  Vital signs in last 24 hours:  [unfilled]  .prog  Weight:   @THISENCWEIGHTS(1)@  Weight change: 3.948 kg (8 lb 11.3 oz)  Body mass index is 13.82 kg/m .    Intake/Output last 3 shifts:  I/O last 3 completed shifts:  In: 181 [I.V.:181]  Out: 1000 [Urine:1000]  Intake/Output this shift:  I/O this shift:  In: 303 [P.O.:300; I.V.:3]  Out: -     Physical Exam:  General: No apparent distress  CV: Regular rate and  rhythm  Lungs: Clear to auscultation  Abdomen: Soft, nontender        Imaging:  Personally Reviewed.  US DAVON with PPG wo Exercise    Result Date: 12/26/2023  EXAM: RESTING ANKLE-BRACHIAL INDICES (ABIs) LOCATION: [facility] DATE: 12/26/2023 INDICATION: diffucult to palpate pedal pulses. COMPARISON: The duplex study from 12/25/2023. DAVON FINDINGS: RIGHT Brachial: 103 Ankle (PT): 103 Index: 1.00 Ankle (DP): 107 Index: 1.04 Digit: 106 Index: 1.03 LEFT Brachial: == Ankle (PT): 110 Index: 1.07 Ankle (DP): 110 Index: 1.07 Digit: 106 Index: 1.03 WAVEFORMS: The dorsalis pedis and posterior tibial arteries are brisk and multiphasic.     IMPRESSION: 1.  RIGHT LOWER EXTREMITY: DAVON at rest is normal. 2.  LEFT LOWER EXTREMITY: DAVON at rest is normal.    MR Ankle Right w/o & w Contrast    Result Date: 12/26/2023  EXAM: MR ANKLE RIGHT W/O and W CONTRAST LOCATION: [facility] DATE: 12/26/2023 INDICATION: [age]-year-old patient with a right ankle infection. Concern for osteomyelitis. COMPARISON: 12/25/2023 radiographs. TECHNIQUE: Routine. Additional postgadolinium T1 sequences were obtained. IV CONTRAST: 4 mL Gadavist. FINDINGS: TENDONS: -Peroneal: Peroneus longus and brevis tendons are intact. No tendinopathy. Small amount of fluid in the tendon sheath. No subluxation. -Medial: Posterior tibialis tendon is intact. No tendinopathy or tenosynovitis. Flexor digitorum longus and flexor hallucis longus tendons are normal. No tenosynovitis. -Anterior: Anterior tibialis, extensor hallucis longus, and extensor digitorum longus tendons are normal. No tenosynovitis. -Achilles: No tendinopathy or paratenonitis. LIGAMENTS: -Anterior talofibular ligament: Intact. -Calcaneofibular ligament: Intact. -Posterior talofibular ligament: Intact. -Syndesmotic inferior tibiofibular ligaments: Intact. -Deltoid ligament complex: Intact. -Spring ligament complex: Intact. JOINTS AND BONES: -There is mild  edema-type signal and enhancement in the calcaneus. -No confluent decreased T1 signal. No fracture. SOFT TISSUES: -There is subcutaneous edema-type signal and mild enhancement in the posterior ankle. -No soft tissue fluid collection. -Small focus of soft tissue thickening in the subcutaneous tissues of the plantar heel, which could represent a small callus.     IMPRESSION: 1.  No evidence of osteomyelitis, septic arthritis, or soft tissue abscess. 2.  Mild calcaneus osteitis, nonspecific. This could be secondary to early infection or stress response. 3.  Posterior ankle subcutaneous edema and probable cellulitis. 4.  Mild peroneal tenosynovitis. 5.  No ligament or tendon tear.     US Lower Extremity Arterial Duplex Bilateral    Result Date: 12/25/2023  EXAM: US LOWER EXTREMITY ARTERIAL DUPLEX BILATERAL LOCATION: [facility] DATE: 12/25/2023 INDICATION: right lower exttremity infection, sepsis, difficult to palpate posterior tibial artery COMPARISON: None. TECHNIQUE: Duplex utilizing 2D gray-scale imaging, Doppler interrogation with color-flow and spectral waveform analysis. FINDINGS: RIGHT- Significant calcified and noncalcified atherosclerosis seen throughout the right lower extremity. Largely multiphasic waveforms to the level of the knee. The posterior tibial, anterior tibial and dorsalis pedis arteries are all patent at the level  of the ankle, although posterior tibial and anterior tibial arteries demonstrate monophasic waveforms. No evidence of distinct critical stenosis or occlusion. LEFT- Significant calcified and noncalcified atherosclerosis. Normal multiphasic waveforms throughout the left lower extremity to the level of the ankle. No evidence of critical stenosis or occlusion. RIGHT LOWER EXTREMITY ARTERIAL ASSESSMENT: External iliac artery 132 cm/s Common femoral artery: 71 cm/s Profunda femoris artery: 62 cm/s SFA (proximal): 104 cm/s SFA (mid): 109 cm/s SFA (distal): 161 cm/s  Popliteal artery (proximal): 153 cm/s Popliteal artery (distal): 78 cm/s Posterior tibial artery: 54 cm/s Anterior tibial artery: 87 cm/s Dorsalis pedis artery: 82 cm/s LEFT LOWER EXTREMITY ARTERIAL ASSESSMENT: External iliac artery 105 cm/s Common femoral artery: 96 cm/s Profunda femoris artery: 111 cm/s SFA (proximal): 97 cm/s SFA (mid): 115 cm/s SFA (distal): 109 cm/s Popliteal artery (proximal): 57 cm/s Popliteal artery (distal): 67 cm/s Posterior tibial artery: 56 cm/s Anterior tibial artery: 81 cm/s Dorsalis pedis artery: 21 cm/s     IMPRESSION: 1.  Significant calcified and noncalcified atherosclerosis throughout the bilateral lower extremities, right slightly greater than left. No evidence of critical stenosis or occlusion bilaterally.     Chest XR,  PA & LAT    Result Date: 12/25/2023  EXAM: XR CHEST 2 VIEWS LOCATION: [facility] DATE: 12/25/2023 INDICATION: fall, pain COMPARISON: None.     IMPRESSION: Heart is normal in size. Lungs are clear.    Ankle XR, G/E 3 views, right    Result Date: 12/25/2023  EXAM: XR ANKLE RIGHT G/E 3 VIEWS LOCATION: [facility] DATE: 12/25/2023 INDICATION: Pain. Wound COMPARISON: None.     IMPRESSION: Normal joint spaces and alignment. No or acute fracture. No evidence of osseous destruction to suggest osteomyelitis, however MRI is more sensitive if indicated. Plantar and retrocalcaneal enthesophytes.    Head CT w/o contrast    Result Date: 12/25/2023  EXAM: CT HEAD W/O CONTRAST, CT CERVICAL SPINE W/O CONTRAST LOCATION: [facility] DATE/TIME: 12/25/2023 4:30 PM CST INDICATION: Headache and neck pain after trauma COMPARISON: CT head dated 05/18/2021 TECHNIQUE: 1) Routine CT Head without IV contrast. Multiplanar reformats. Dose reduction techniques were used. 2) Routine CT Cervical Spine without IV contrast. Multiplanar reformats. Dose reduction techniques were used. FINDINGS: HEAD CT: INTRACRANIAL CONTENTS:  No acute intracranial hemorrhage. No CT evidence of acute infarct. Sequelae of mild to moderate chronic microangiopathy. Mild to moderate cerebral volume loss without hydrocephalus. No extra-axial fluid collections.  Patent basal cisterns. VISUALIZED ORBITS/SINUSES/MASTOIDS: The orbits are unremarkable. Mild paranasal sinus mucosal thickening. The temporal bone structures are well-aerated. BONES/SOFT TISSUES: The calvarium and skull base are unremarkable. CERVICAL SPINE CT: VERTEBRA: The spine is imaged from the skull base through superior endplate of T2. Straightening of the usual cervical lordosis without significant spondylolisthesis. No evidence of acute fracture. No aggressive osseous lesion.   CANAL/FORAMINA: Multilevel degenerative changes without high-grade spinal canal stenosis or high-grade neural foraminal narrowing. PARASPINAL: No prevertebral or paravertebral soft tissue swelling.  Visualized lungs are clear. The thyroid gland is unremarkable.     IMPRESSION: HEAD CT: 1. Senescent changes and sequelae of chronic microangiopathy without acute intracranial abnormality. CERVICAL SPINE CT: 1. No acute traumatic injury of the cervical spine.     CT Cervical Spine w/o Contrast    Result Date: 12/25/2023  EXAM: CT HEAD W/O CONTRAST, CT CERVICAL SPINE W/O CONTRAST LOCATION: [facility] DATE/TIME: 12/25/2023 4:30 PM CST INDICATION: Headache and neck pain after trauma COMPARISON: CT head dated 05/18/2021 TECHNIQUE: 1) Routine CT Head without IV contrast. Multiplanar reformats. Dose reduction techniques were used. 2) Routine CT Cervical Spine without IV contrast. Multiplanar reformats. Dose reduction techniques were used. FINDINGS: HEAD CT: INTRACRANIAL CONTENTS: No acute intracranial hemorrhage. No CT evidence of acute infarct. Sequelae of mild to moderate chronic microangiopathy. Mild to moderate cerebral volume loss without hydrocephalus. No extra-axial fluid collections.  Patent basal  "cisterns. VISUALIZED ORBITS/SINUSES/MASTOIDS: The orbits are unremarkable. Mild paranasal sinus mucosal thickening. The temporal bone structures are well-aerated. BONES/SOFT TISSUES: The calvarium and skull base are unremarkable. CERVICAL SPINE CT: VERTEBRA: The spine is imaged from the skull base through superior endplate of T2. Straightening of the usual cervical lordosis without significant spondylolisthesis. No evidence of acute fracture. No aggressive osseous lesion.   CANAL/FORAMINA: Multilevel degenerative changes without high-grade spinal canal stenosis or high-grade neural foraminal narrowing. PARASPINAL: No prevertebral or paravertebral soft tissue swelling.  Visualized lungs are clear. The thyroid gland is unremarkable.     IMPRESSION: HEAD CT: 1. Senescent changes and sequelae of chronic microangiopathy without acute intracranial abnormality. CERVICAL SPINE CT: 1. No acute traumatic injury of the cervical spine.       Lab Results:  Personally Reviewed.   Fingerstick Blood Glucose: @THVOTKX29WVG(POCGLUFGR:10)@    Last Hbg A1C: No results found for: \"HGBA1C\"   No results found for: \"INR\", \"PROTIME\"  Recent Results (from the past 24 hour(s))   Glucose by meter    Collection Time: 12/26/23 12:12 PM   Result Value Ref Range    GLUCOSE BY METER POCT 256 (H) 70 - 99 mg/dL   Glucose by meter    Collection Time: 12/26/23  7:23 PM   Result Value Ref Range    GLUCOSE BY METER POCT 126 (H) 70 - 99 mg/dL   Glucose by meter    Collection Time: 12/26/23 10:53 PM   Result Value Ref Range    GLUCOSE BY METER POCT 228 (H) 70 - 99 mg/dL   Glucose by meter    Collection Time: 12/27/23  2:24 AM   Result Value Ref Range    GLUCOSE BY METER POCT 191 (H) 70 - 99 mg/dL   Creatinine    Collection Time: 12/27/23  5:55 AM   Result Value Ref Range    Creatinine 0.65 0.51 - 0.95 mg/dL    GFR Estimate >90 >60 mL/min/1.73m2   CBC with platelets    Collection Time: 12/27/23  6:50 AM   Result Value Ref Range    WBC Count 5.0 4.0 - 11.0 " 10e3/uL    RBC Count 3.55 (L) 3.80 - 5.20 10e6/uL    Hemoglobin 11.3 (L) 11.7 - 15.7 g/dL    Hematocrit 34.5 (L) 35.0 - 47.0 %    MCV 97 78 - 100 fL    MCH 31.8 26.5 - 33.0 pg    MCHC 32.8 31.5 - 36.5 g/dL    RDW 12.8 10.0 - 15.0 %    Platelet Count 108 (L) 150 - 450 10e3/uL   Glucose by meter    Collection Time: 12/27/23  7:55 AM   Result Value Ref Range    GLUCOSE BY METER POCT 95 70 - 99 mg/dL           Advanced Care Planning    Medical Decision Making       35 MINUTES SPENT BY ME on the date of service doing chart review, history, exam, documentation & further activities per the note.      Je Ren MD  Date: 12/27/2023  Time: 11:36 AM  [facility]  Family Medicine

## 2023-12-27 NOTE — PLAN OF CARE
"  Problem: Adult Inpatient Plan of Care  Goal: Absence of Hospital-Acquired Illness or Injury  Intervention: Prevent Skin Injury  Recent Flowsheet Documentation  Taken 12/27/2023 0400 by Ary Couch RN  Body Position:   turned   lower extremity elevated   log-rolled   left  Taken 12/26/2023 2315 by Ary Couch RN  Body Position:   turned   right   lower extremity elevated  Taken 12/26/2023 2150 by Ary Couch RN  Body Position:   turned   right   lower extremity elevated     Problem: Sepsis/Septic Shock  Goal: Absence of Infection Signs and Symptoms  Intervention: Promote Recovery  Recent Flowsheet Documentation  Taken 12/26/2023 2315 by Ary Couch RN  Activity Management: bedrest  Taken 12/26/2023 2150 by Ary Couch RN  Activity Management: bedrest   Goal Outcome Evaluation:        Lab called for positive blood culture collected on 12/25 from R. peripheral. Gram positive Cocci in clusters, Pt currently on vanco and zoysn. Pt alert to self only per family. Unable to assess orientation. Pt heel elevated on pillow, repositioned for comfort. Right dressing c/d/I.  /52 (BP Location: Left arm)   Pulse 55   Temp 98.2  F (36.8  C) (Oral)   Resp 18   Ht 1.473 m (4' 10\")   Wt 34.4 kg (75 lb 13.4 oz)   SpO2 99%   BMI 15.85 kg/m   IV abx given as prescribed.                 "

## 2023-12-27 NOTE — PROGRESS NOTES
"[facility] Progress Note    SUBJECTIVE:  spoke to daughter Lidia by phone in detail.  Ankle is wrapped.        REVIEW OF SYSTEMS:  Negative unless as listed above.  Social history, Family history, Medications: reviewed.    OBJECTIVE:  /53 (BP Location: Left arm)   Pulse 62   Temp 97.4  F (36.3  C) (Oral)   Resp 16   Ht 1.473 m (4' 10\")   Wt 30 kg (66 lb 2.2 oz)   SpO2 98%   BMI 13.82 kg/m                PHYSICAL EXAM:  Alert, awake  Vitals tabulated above, reviewed  Neck supple without lymphadenopathy  Sclera normal color, not injected  CARDIOVASCULAR regular rate and rhythm, no murmur  Lungs CLEAR TO AUSCULTATION   Abdomen soft, NT/ND, absent HEPATOSPLENOMEGALY  Skin normal  Joints normal  Neurologic exam non focal  See photo for ankle wound    Antibiotics:  Zosyn    Pertinent labs:    Recent Labs   Lab Test 12/27/23  0650 12/26/23  0624 12/25/23  1423   WBC 5.0 3.4* 4.9   HGB 11.3* 10.9* 12.9   HCT 34.5* 33.0* 38.8   MCV 97 96 95   * 93* 120*       Lab Results   Component Value Date    RBC 3.55 12/27/2023     Lab Results   Component Value Date    HGB 11.3 12/27/2023    HGB 13.0 06/19/2012     Lab Results   Component Value Date    HCT 34.5 12/27/2023    HCT 39.5 06/19/2012     No components found for: \"MCT\"  Lab Results   Component Value Date    MCV 97 12/27/2023    MCV 86.3 06/19/2012     Lab Results   Component Value Date    MCH 31.8 12/27/2023    MCH 28.4 06/19/2012     Lab Results   Component Value Date    MCHC 32.8 12/27/2023    MCHC 32.9 06/19/2012     Lab Results   Component Value Date    RDW 12.8 12/27/2023     Lab Results   Component Value Date     12/27/2023       Last Comprehensive Metabolic Panel:  Sodium   Date Value Ref Range Status   12/26/2023 141 135 - 145 mmol/L Final     Comment:     Reference intervals for this test were updated on 09/26/2023 to more accurately reflect our healthy population. There may be differences in the flagging of prior results " with similar values performed with this method. Interpretation of those prior results can be made in the context of the updated reference intervals.    09/20/2012 142 136 - 145 mmol/L Final     Potassium   Date Value Ref Range Status   12/26/2023 4.1 3.4 - 5.3 mmol/L Final   05/18/2021 4.1 3.5 - 5.0 mmol/L Final   09/20/2012 3.7 3.5 - 5.0 mmol/L Final     Chloride   Date Value Ref Range Status   12/26/2023 107 98 - 107 mmol/L Final   05/18/2021 107 98 - 107 mmol/L Final   09/20/2012 101 98 - 107 mmol/L Final     Carbon Dioxide (CO2)   Date Value Ref Range Status   12/26/2023 28 22 - 29 mmol/L Final   05/18/2021 26 22 - 31 mmol/L Final     Anion Gap   Date Value Ref Range Status   12/26/2023 6 (L) 7 - 15 mmol/L Final   05/18/2021 10 5 - 18 mmol/L Final     Glucose   Date Value Ref Range Status   05/18/2021 210 (H) 70 - 125 mg/dL Final   09/20/2012 154 (H) 70 - 125 mg/dL Final     Comment:          Fasting Glucose reference range is 70-99 mg/dL per       American Diabetes Association (ADA) guidelines.     GLUCOSE BY METER POCT   Date Value Ref Range Status   12/27/2023 95 70 - 99 mg/dL Final     Urea Nitrogen   Date Value Ref Range Status   12/26/2023 10.5 8.0 - 23.0 mg/dL Final   05/18/2021 16 8 - 22 mg/dL Final   09/20/2012 15 8 - 22 mg/dL Final     Creatinine   Date Value Ref Range Status   12/27/2023 0.65 0.51 - 0.95 mg/dL Final   09/20/2012 0.71 0.60 - 1.10 mg/dL Final     GFR Estimate   Date Value Ref Range Status   12/27/2023 >90 >60 mL/min/1.73m2 Final   05/18/2021 57 (L) >60 mL/min/1.73m2 Final   09/20/2012 > 60 >60 mL/min/1.7 Final     Calcium   Date Value Ref Range Status   12/26/2023 8.6 (L) 8.8 - 10.2 mg/dL Final   09/20/2012 9.5 8.5 - 10.5 mg/dL Final       Liver Function Studies -   Recent Labs   Lab Test 04/28/18  1701   PROTTOTAL 6.5   ALBUMIN 3.8   BILITOTAL 0.3   ALKPHOS 57   AST 17   ALT 17       Sed Rate   Date Value Ref Range Status   09/20/2012 48 (H) <21 mm/hr Final     Erythrocyte  Sedimentation Rate   Date Value Ref Range Status   12/25/2023 76 (H) 0 - 30 mm/hr Final       C-Reactive Protein   Date Value Ref Range Status   07/19/2011 5.7 (H) <0.9 mg/dL Final               MICROBIOLOGY DATA:  R ankle micro is GNB and gram +  BC is MSSA      IMAGING/RADIOLOGY:                                                                     IMPRESSION:  1.  No evidence of osteomyelitis, septic arthritis, or soft tissue abscess.  2.  Mild calcaneus osteitis, nonspecific. This could be secondary to early infection or stress response.  3.  Posterior ankle subcutaneous edema and probable cellulitis.  4.  Mild peroneal tenosynovitis.  5.  No ligament or tendon tear.      ASSESSMENT:  Limited MSSA bacteremia  No osteo, with cellulitis of R ankle  Dementia REALLY impacts our decision making process here  RECOMMENDATION:  PICC probably a bad idea, will likely pull it and family will not have pt go to TCU  Meds will need to be crushed per daughter and mixed in with her food.    Will probably do sulfa to accomplish this as best we can with somewhat limited expectations.      I would not bother with TTE either on this case as doesn't alter our management and no serious concern this is endocarditis (we have a source).    HEAVENLY Haywood MD  Office [phone] option 2 to desk staff

## 2023-12-27 NOTE — PLAN OF CARE
[id number]  Problem: Sepsis/Septic Shock  Goal: Blood Glucose Level Within Targeted Range  Outcome: Progressing     Problem: Sepsis/Septic Shock  Goal: Absence of Infection Signs and Symptoms  Outcome: Progressing  Intervention: Promote Recovery  Recent Flowsheet Documentation  Taken 12/27/2023 0826 by Snehal Jones, RN  Activity Management: activity adjusted per tolerance     Problem: Sepsis/Septic Shock  Goal: Optimal Nutrition Intake  Outcome: Progressing   Goal Outcome Evaluation:  Patient alert to self.  Up to the chair by Physical therapy with assist of 1.  Seroquel 25 mg given for increase agitation ,helpful  Good fluid intake with encouragement.   Pure wick in place, good urine output see flowsheet.  PRN senna given unable to take scheduled arabella KEYES notified.

## 2023-12-28 PROBLEM — B95.61 STAPHYLOCOCCUS AUREUS BACTEREMIA WITHOUT SEPSIS: Status: ACTIVE | Noted: 2023-12-28

## 2023-12-28 PROBLEM — R78.81 STAPHYLOCOCCUS AUREUS BACTEREMIA WITHOUT SEPSIS: Status: ACTIVE | Noted: 2023-12-28

## 2023-12-28 LAB
BACTERIA BLD CULT: ABNORMAL
BACTERIA BLD CULT: ABNORMAL
BACTERIA WND CULT: ABNORMAL
BACTERIA WND CULT: ABNORMAL
CREAT SERPL-MCNC: 0.55 MG/DL (ref 0.51–0.95)
EGFRCR SERPLBLD CKD-EPI 2021: >90 ML/MIN/1.73M2
GLUCOSE BLDC GLUCOMTR-MCNC: 153 MG/DL (ref 70–99)
GLUCOSE BLDC GLUCOMTR-MCNC: 173 MG/DL (ref 70–99)
GLUCOSE BLDC GLUCOMTR-MCNC: 188 MG/DL (ref 70–99)
GLUCOSE BLDC GLUCOMTR-MCNC: 91 MG/DL (ref 70–99)
GRAM STAIN RESULT: ABNORMAL

## 2023-12-28 PROCEDURE — 250N000013 HC RX MED GY IP 250 OP 250 PS 637: Performed by: HOSPITALIST

## 2023-12-28 PROCEDURE — 120N000001 HC R&B MED SURG/OB

## 2023-12-28 PROCEDURE — 250N000011 HC RX IP 250 OP 636: Performed by: FAMILY MEDICINE

## 2023-12-28 PROCEDURE — 250N000013 HC RX MED GY IP 250 OP 250 PS 637: Performed by: FAMILY MEDICINE

## 2023-12-28 PROCEDURE — 99232 SBSQ HOSP IP/OBS MODERATE 35: CPT | Performed by: INTERNAL MEDICINE

## 2023-12-28 PROCEDURE — 87186 SC STD MICRODIL/AGAR DIL: CPT

## 2023-12-28 PROCEDURE — 36415 COLL VENOUS BLD VENIPUNCTURE: CPT | Performed by: FAMILY MEDICINE

## 2023-12-28 PROCEDURE — 82565 ASSAY OF CREATININE: CPT | Performed by: FAMILY MEDICINE

## 2023-12-28 PROCEDURE — 99233 SBSQ HOSP IP/OBS HIGH 50: CPT | Performed by: HOSPITALIST

## 2023-12-28 PROCEDURE — 36415 COLL VENOUS BLD VENIPUNCTURE: CPT

## 2023-12-28 RX ORDER — SULFAMETHOXAZOLE AND TRIMETHOPRIM 200; 40 MG/5ML; MG/5ML
20 SUSPENSION ORAL EVERY 12 HOURS
Status: DISCONTINUED | OUTPATIENT
Start: 2023-12-28 | End: 2023-12-29 | Stop reason: HOSPADM

## 2023-12-28 RX ORDER — SULFAMETHOXAZOLE/TRIMETHOPRIM 800-160 MG
1 TABLET ORAL EVERY 12 HOURS
Status: DISCONTINUED | OUTPATIENT
Start: 2023-12-28 | End: 2023-12-29 | Stop reason: HOSPADM

## 2023-12-28 RX ORDER — HONEY 100 %
PASTE (ML) TOPICAL EVERY 6 HOURS PRN
Qty: 44 ML | Refills: 0 | Status: SHIPPED | OUTPATIENT
Start: 2023-12-28

## 2023-12-28 RX ADMIN — PIPERACILLIN AND TAZOBACTAM 3.38 G: 3; .375 INJECTION, POWDER, FOR SOLUTION INTRAVENOUS at 02:19

## 2023-12-28 RX ADMIN — INSULIN ASPART 1 UNITS: 100 INJECTION, SOLUTION INTRAVENOUS; SUBCUTANEOUS at 19:54

## 2023-12-28 RX ADMIN — ACETAMINOPHEN 650 MG: 325 TABLET ORAL at 18:54

## 2023-12-28 RX ADMIN — Medication 5 MG: at 02:19

## 2023-12-28 RX ADMIN — DOCUSATE SODIUM 50 MG AND SENNOSIDES 8.6 MG 2 TABLET: 8.6; 5 TABLET, FILM COATED ORAL at 12:47

## 2023-12-28 RX ADMIN — SULFAMETHOXAZOLE AND TRIMETHOPRIM 1 TABLET: 800; 160 TABLET ORAL at 18:54

## 2023-12-28 RX ADMIN — PIPERACILLIN AND TAZOBACTAM 3.38 G: 3; .375 INJECTION, POWDER, FOR SOLUTION INTRAVENOUS at 12:49

## 2023-12-28 RX ADMIN — INSULIN ASPART 1 UNITS: 100 INJECTION, SOLUTION INTRAVENOUS; SUBCUTANEOUS at 12:46

## 2023-12-28 ASSESSMENT — ACTIVITIES OF DAILY LIVING (ADL)
ADLS_ACUITY_SCORE: 56
ADLS_ACUITY_SCORE: 51
ADLS_ACUITY_SCORE: 58
ADLS_ACUITY_SCORE: 51
ADLS_ACUITY_SCORE: 56
ADLS_ACUITY_SCORE: 58
ADLS_ACUITY_SCORE: 51
ADLS_ACUITY_SCORE: 58
ADLS_ACUITY_SCORE: 58
ADLS_ACUITY_SCORE: 51

## 2023-12-28 NOTE — PROGRESS NOTES
"Subjective:    Pt is seen today for right lateral malleoli are ulcer with surrounding cellulitis.  Patient resting in bed.  Seen with  who speaks a little bit of English.    ROS:         No Known Allergies    No current outpatient medications on file.       Patient Active Problem List   Diagnosis    Health Care Home    Major depressive disorder, recurrent episode (H24)    Dyspepsia    Hyperlipidemia    RA (rheumatoid arthritis) (H)    Diabetes type 2, uncontrolled    Wound infection    Cellulitis, unspecified cellulitis site       Past Medical History:   Diagnosis Date    Dementia (H)     Depression     DM2 (diabetes mellitus, type 2) (H)     Hypercholesteremia     Rheumatoid aortitis        Past Surgical History:   Procedure Laterality Date     SECTION         History reviewed. No pertinent family history.    Social History     Tobacco Use    Smoking status: Never    Smokeless tobacco: Not on file   Substance Use Topics    Alcohol use: Not Currently         Exam:    Vitals: /56 (BP Location: Left leg)   Pulse 62   Temp 97.6  F (36.4  C) (Axillary)   Resp 16   Ht 1.473 m (4' 10\")   Wt 30 kg (66 lb 2.2 oz)   SpO2 100%   BMI 13.82 kg/m    BMI: Body mass index is 13.82 kg/m .  Height: 4' 10\"    Constitutional/ general:  Pt is in no apparent distress, appears well-nourished.  Cooperative with history and physical exam.     Musculoskeletal:   Dressing clean dry and intact.  With removal of this there is much less erythema and edema surrounding the right lateral malleoli are wound.  Some peeling skin noted on the wound.  We are to gently remove this.  Underlying skin irritated but intact.  Over the apex of the lateral malleoli fibrotic tissue noted.  No purulence or odor.    Arterial ultrasound shows adequate circulation for healing.    Collected 2023  4:57 PM       Status: Preliminary result       Visible to patient: No (not released)    Specimen Information: Ankle, Right; Wound   0 " Result Notes  Culture Culture in progress      2+ Gram negative bacilli Abnormal       4+ Staphylococcus aureus Abnormal            Latest Reference Range & Units Most Recent   WBC 4.0 - 11.0 10e3/uL 5.0  12/27/23 06:50       Radiographic Exam:    Narrative & Impression   EXAM: MR ANKLE RIGHT W/O and W CONTRAST  LOCATION: [facility]  DATE: 12/26/2023     INDICATION: [age]-year-old patient with a right ankle infection. Concern for osteomyelitis.     COMPARISON: 12/25/2023 radiographs.     TECHNIQUE: Routine. Additional postgadolinium T1 sequences were obtained.     IV CONTRAST: 4 mL Gadavist.     FINDINGS:   TENDONS:   -Peroneal: Peroneus longus and brevis tendons are intact. No tendinopathy. Small amount of fluid in the tendon sheath. No subluxation.  -Medial: Posterior tibialis tendon is intact. No tendinopathy or tenosynovitis. Flexor digitorum longus and flexor hallucis longus tendons are normal. No tenosynovitis.  -Anterior: Anterior tibialis, extensor hallucis longus, and extensor digitorum longus tendons are normal. No tenosynovitis.  -Achilles: No tendinopathy or paratenonitis.     LIGAMENTS:   -Anterior talofibular ligament: Intact.   -Calcaneofibular ligament: Intact.   -Posterior talofibular ligament: Intact.  -Syndesmotic inferior tibiofibular ligaments: Intact.  -Deltoid ligament complex: Intact.  -Spring ligament complex: Intact.     JOINTS AND BONES:   -There is mild edema-type signal and enhancement in the calcaneus.   -No confluent decreased T1 signal. No fracture.     SOFT TISSUES:  -There is subcutaneous edema-type signal and mild enhancement in the posterior ankle.   -No soft tissue fluid collection.   -Small focus of soft tissue thickening in the subcutaneous tissues of the plantar heel, which could represent a small callus.                                                                      IMPRESSION:  1.  No evidence of osteomyelitis, septic arthritis, or soft  tissue abscess.  2.  Mild calcaneus osteitis, nonspecific. This could be secondary to early infection or stress response.  3.  Posterior ankle subcutaneous edema and probable cellulitis.  4.  Mild peroneal tenosynovitis.  5.  No ligament or tendon tear.       A:  Diabetes mellitus   RA  Right lateral malleoli are decubitus ulcer with resolving cellulitis    P: Gently removed some of the sloughing skin on the periphery of the central wound.  We placed a new dressing on foot.  Foot much improved.  Continue dressings and offloading.  Continue antibiotics per infectious disease.  No need for surgery at this time.  Will sign off.  Please have patient follow-up at [facility] wound healing clinic upon discharge.    Xavier Powers DPM, FACFAS

## 2023-12-28 NOTE — PROGRESS NOTES
[facility]    Medicine Progress Note - Hospitalist Service    Date of Admission:  12/25/2023    Assessment & Plan                Donal Coker is a 63 year old female with dementia admitted with complicated skin infection and bacteremia. Hospital Day: 4       Sepsis due to right ankle soft tissue infection  MSSA bacteremia  -presented with sepsis based on Temp > 38 C, HR > 90 bpm, and RR > 20 breaths/min due to infection  -blood cultures from 12/25 with MSSA  -wound culture polymicrobial  -repeat blood culture from 12/26 no growth >48 hours  -MRI showed no evidence of osteomyelitis, septic arthritis or soft tissue abscess.  -Currently on Zosyn  -ID following  -treatment plan complicated by dementia and patient unlikely to leave PICC in place. She does not swallow pills, but will take meds crushed. ID recommending 4 weeks of Bactrim.  -Podiatry saw and local wound cares recommended  -Seen by wound care nurse, family interested in RN for home wound care at discharge.  -Given advanced dementia no significant workup with MELIDA recommended nor PICC line.   -for today, change to PO abx and make sure she tolerate and will take it. Check labs in AM. Will need labs with PCP in 1 week.     Diabetes mellitus, type II  --- Has been off medications since summer  --- A1c on admission was 5.8  --- Insulin sliding scale while here. Not needing much insulin     Rheumatoid arthritis  --- On Remicade infusion. This should be held while she is on Bactrim.     Dementia  --- She is at risk for encephalopathy.  Encephalopathy order set initiated  -family care for her at home. She verbalizes but does not understand speech.       DVT Prophylaxis: Moderate risk. SCDs  Diet: Combination Diet Regular Diet Adult; Moderate Consistent Carb (60 g CHO per Meal) Diet    Poole Catheter: Not present  Lines: None     Cardiac Monitoring: None  Code Status: Full Code      Clinically Significant Risk Factors                #  "Thrombocytopenia: Lowest platelets = 108 in last 2 days, will monitor for bleeding          # Cachexia: Estimated body mass index is 13.82 kg/m  as calculated from the following:    Height as of this encounter: 1.473 m (4' 10\").    Weight as of this encounter: 30 kg (66 lb 2.2 oz)., PRESENT ON ADMISSION            Disposition Plan   Disposition: Home with home care    Expected Discharge Date: 12/28/2023    Discharge Delays: IV Medication - consider oral or Home Infusion  Destination: home with family;home with help/services       Medically ready to discharge today: No     The patient's care was discussed with the Patient's Family.    Kandy Reyna MD  Hospitalist Service  [facility]  Securely message with Share[employer]k (more info)  Text page via [employer] Paging/Directory   ______________________________________________________________________      Physical Exam   Vital Signs: Temp: 97.5  F (36.4  C) Temp src: Axillary BP: 118/58 Pulse: 66   Resp: 16 SpO2: 100 % O2 Device: None (Room air)    Weight: 66 lbs 2.21 oz  General: in no apparent distress, non-toxic, and alert female sitting in bedside chair not oriented to person, place or time. Mumbles to herself but does not respond to questions  HEENT: Head normocephalic, fading ecchymosis over R temple, oral mucosa moist. Sclerae anicteric  CV: Regular rhythm, normal rate, no murmurs  Resp: No wheezes, no rales or rhonchi, no focal consolidations  GI: Belly soft, nondistended, nontender, bowel sounds present  Skin:  scattered scabs BLE, R foot dressing not removed for exam  Extremities: No peripheral edema  Psych: Flat affect, mood euthymic  Neuro:  seems globally weak        Medical Decision Making               Data   Recent Results (from the past 12 hour(s))   Creatinine    Collection Time: 12/28/23  6:49 AM   Result Value Ref Range    Creatinine 0.55 0.51 - 0.95 mg/dL    GFR Estimate >90 >60 mL/min/1.73m2   Glucose by meter    Collection Time: " 12/28/23  7:28 AM   Result Value Ref Range    GLUCOSE BY METER POCT 91 70 - 99 mg/dL   Glucose by meter    Collection Time: 12/28/23 11:57 AM   Result Value Ref Range    GLUCOSE BY METER POCT 153 (H) 70 - 99 mg/dL     Interval History     Patient seems to be doing okay today.  No new issues.  Does not seem to be in any pain per family member.  She was noted pulling on IV tubing while I was in the room.  Maybe approaching discharge readiness.  Think it may be a good idea to trial her on oral antibiotic and make sure she keeps this down, follow labs for 1 more day etc.  Will call and discuss plan with family.

## 2023-12-28 NOTE — PLAN OF CARE
Goal Outcome Evaluation:    Problem: Sepsis/Septic Shock  Goal: Blood Glucose Level Within Targeted Range  Outcome: Progressing  Alert to self only. Assist 1-2 with transfers.  Patient is afebrile. Blood glucose 91 and 157 received insulin appropriately  No S/S of pain noted .    Good intake and output.   Up in the for meals. Patient is feeder.  Small BM noted 2 senna given.  Wound to right ankle changed as ordered.  Family at bedside.  Will continue to monitor.

## 2023-12-28 NOTE — PROGRESS NOTES
"Care Management Follow Up    Length of Stay (days): 3    Expected Discharge Date: 12/29/2023       Concerns to be Addressed: infection IV antibiotic to switch to oral      Patient plan of care discussed at interdisciplinary rounds: Yes     Anticipated Discharge Disposition:  home     Anticipated Discharge Services:  home care RN     Anticipated Discharge DME:  per therapy        Additional Information:  Patient admitted with complicated skin infection and now with bacteremia.  ID consulted, currently IV Zosyn, cultures pending, diagnostic work up. On room air.  WOC: daily wound care recommended to right lateral malleolus.      Therapy: Patient is assist of one with need for 24/7 care.      Social History:  Donal lives in a house with her  and children. She has PCA help from family for all ADLs and IADLs. Needs assist of one.   Per family, \"She has a dementia diagnosis. She is not able to communicate because of the dementia.\"  Family to transport at discharge.        12/28/23:  Patient accepted by [facility] for RN. Spoke with daughter Lidia who states patient will be moving in with her at her home in [place]. She agrees to having home RN services. Address changed in demographics. Voice message left with [facility] liaison notifying them of address change.         Gina Contreras RN      "

## 2023-12-28 NOTE — PLAN OF CARE
"Goal Outcome Evaluation:      Outcome Evaluation: Pt calm and coooperative. Oriented to self. Takes pills finely crushed with applesauce. Good appetite. Total feed. Purwick in place and draining yellow urine. Melatonin given with for sleep, effective. Assist X2. VSS. , no correction. Refuses compression socks. Loves plush toy, calls it her baby - effective at keeping hands from being restless.      /65 (BP Location: Left leg)   Pulse 75   Temp 97.3  F (36.3  C) (Axillary)   Resp 16   Ht 1.473 m (4' 10\")   Wt 30 kg (66 lb 2.2 oz)   SpO2 99%   BMI 13.82 kg/m      Cecil Croft RN    "

## 2023-12-28 NOTE — PROGRESS NOTES
"[facility] Progress Note    SUBJECTIVE: ulcer cleaned up by pods.        REVIEW OF SYSTEMS:  Negative unless as listed above.  Social history, Family history, Medications: reviewed.    OBJECTIVE:  /58 (BP Location: Left leg)   Pulse 66   Temp 97.7  F (36.5  C) (Axillary)   Resp 16   Ht 1.473 m (4' 10\")   Wt 30 kg (66 lb 2.2 oz)   SpO2 100%   BMI 13.82 kg/m                PHYSICAL EXAM:  Alert, awake  Vitals tabulated above, reviewed  Neck supple without lymphadenopathy  Sclera normal color, not injected  CARDIOVASCULAR regular rate and rhythm, no murmur  Lungs CLEAR TO AUSCULTATION   Abdomen soft, NT/ND, absent HEPATOSPLENOMEGALY  Skin normal  Joints normal  Neurologic exam non focal  See photo for ankle wound    Antibiotics:  Zosyn    Pertinent labs:    Recent Labs   Lab Test 12/27/23  0650 12/26/23  0624 12/25/23  1423   WBC 5.0 3.4* 4.9   HGB 11.3* 10.9* 12.9   HCT 34.5* 33.0* 38.8   MCV 97 96 95   * 93* 120*       Lab Results   Component Value Date    RBC 3.55 12/27/2023     Lab Results   Component Value Date    HGB 11.3 12/27/2023    HGB 13.0 06/19/2012     Lab Results   Component Value Date    HCT 34.5 12/27/2023    HCT 39.5 06/19/2012     No components found for: \"MCT\"  Lab Results   Component Value Date    MCV 97 12/27/2023    MCV 86.3 06/19/2012     Lab Results   Component Value Date    MCH 31.8 12/27/2023    MCH 28.4 06/19/2012     Lab Results   Component Value Date    MCHC 32.8 12/27/2023    MCHC 32.9 06/19/2012     Lab Results   Component Value Date    RDW 12.8 12/27/2023     Lab Results   Component Value Date     12/27/2023       Last Comprehensive Metabolic Panel:  Sodium   Date Value Ref Range Status   12/26/2023 141 135 - 145 mmol/L Final     Comment:     Reference intervals for this test were updated on 09/26/2023 to more accurately reflect our healthy population. There may be differences in the flagging of prior results with similar values performed with " this method. Interpretation of those prior results can be made in the context of the updated reference intervals.    09/20/2012 142 136 - 145 mmol/L Final     Potassium   Date Value Ref Range Status   12/26/2023 4.1 3.4 - 5.3 mmol/L Final   05/18/2021 4.1 3.5 - 5.0 mmol/L Final   09/20/2012 3.7 3.5 - 5.0 mmol/L Final     Chloride   Date Value Ref Range Status   12/26/2023 107 98 - 107 mmol/L Final   05/18/2021 107 98 - 107 mmol/L Final   09/20/2012 101 98 - 107 mmol/L Final     Carbon Dioxide (CO2)   Date Value Ref Range Status   12/26/2023 28 22 - 29 mmol/L Final   05/18/2021 26 22 - 31 mmol/L Final     Anion Gap   Date Value Ref Range Status   12/26/2023 6 (L) 7 - 15 mmol/L Final   05/18/2021 10 5 - 18 mmol/L Final     Glucose   Date Value Ref Range Status   05/18/2021 210 (H) 70 - 125 mg/dL Final   09/20/2012 154 (H) 70 - 125 mg/dL Final     Comment:          Fasting Glucose reference range is 70-99 mg/dL per       American Diabetes Association (ADA) guidelines.     GLUCOSE BY METER POCT   Date Value Ref Range Status   12/28/2023 91 70 - 99 mg/dL Final     Urea Nitrogen   Date Value Ref Range Status   12/26/2023 10.5 8.0 - 23.0 mg/dL Final   05/18/2021 16 8 - 22 mg/dL Final   09/20/2012 15 8 - 22 mg/dL Final     Creatinine   Date Value Ref Range Status   12/28/2023 0.55 0.51 - 0.95 mg/dL Final   09/20/2012 0.71 0.60 - 1.10 mg/dL Final     GFR Estimate   Date Value Ref Range Status   12/28/2023 >90 >60 mL/min/1.73m2 Final   05/18/2021 57 (L) >60 mL/min/1.73m2 Final   09/20/2012 > 60 >60 mL/min/1.7 Final     Calcium   Date Value Ref Range Status   12/26/2023 8.6 (L) 8.8 - 10.2 mg/dL Final   09/20/2012 9.5 8.5 - 10.5 mg/dL Final       Liver Function Studies -   Recent Labs   Lab Test 04/28/18  1701   PROTTOTAL 6.5   ALBUMIN 3.8   BILITOTAL 0.3   ALKPHOS 57   AST 17   ALT 17       Sed Rate   Date Value Ref Range Status   09/20/2012 48 (H) <21 mm/hr Final     Erythrocyte Sedimentation Rate   Date Value Ref Range  Status   12/25/2023 76 (H) 0 - 30 mm/hr Final       C-Reactive Protein   Date Value Ref Range Status   07/19/2011 5.7 (H) <0.9 mg/dL Final               MICROBIOLOGY DATA:  R ankle micro is pan S e coli and mssa  BC is MSSA      IMAGING/RADIOLOGY:                                                                     IMPRESSION:  1.  No evidence of osteomyelitis, septic arthritis, or soft tissue abscess.  2.  Mild calcaneus osteitis, nonspecific. This could be secondary to early infection or stress response.  3.  Posterior ankle subcutaneous edema and probable cellulitis.  4.  Mild peroneal tenosynovitis.  5.  No ligament or tendon tear.      ASSESSMENT:  Limited MSSA bacteremia  No osteo, with cellulitis of R ankle  Dementia REALLY impacts our decision making process here    RECOMMENDATION:  PICC probably a bad idea, will likely pull it and family will not have pt go to TCU  Meds will need to be crushed per daughter and mixed in with her food.    Sulfa DS crushed up into food BID for 28 days is my best recommendation based on pt and family preferences and clinical scenario.  Would need to watch closely for failure on this, and reconsider IV abx if that happens.  Stay well hydrated on the sulfa, push oral fluids.     I would not bother with TTE either on this case as doesn't alter our management and no serious concern this is endocarditis (we have a source).    HEAVENLY Haywood MD  Office [phone] option 2 to desk staff

## 2023-12-29 VITALS
TEMPERATURE: 97.2 F | OXYGEN SATURATION: 98 % | HEIGHT: 58 IN | HEART RATE: 90 BPM | SYSTOLIC BLOOD PRESSURE: 131 MMHG | BODY MASS INDEX: 13.88 KG/M2 | WEIGHT: 66.14 LBS | DIASTOLIC BLOOD PRESSURE: 65 MMHG | RESPIRATION RATE: 16 BRPM

## 2023-12-29 LAB
ANION GAP SERPL CALCULATED.3IONS-SCNC: 5 MMOL/L (ref 7–15)
ATRIAL RATE - MUSE: 141 BPM
BACTERIA BLD CULT: ABNORMAL
BACTERIA BLD CULT: ABNORMAL
BUN SERPL-MCNC: 6.4 MG/DL (ref 8–23)
CALCIUM SERPL-MCNC: 9 MG/DL (ref 8.8–10.2)
CHLORIDE SERPL-SCNC: 107 MMOL/L (ref 98–107)
CREAT SERPL-MCNC: 0.47 MG/DL (ref 0.51–0.95)
DEPRECATED HCO3 PLAS-SCNC: 32 MMOL/L (ref 22–29)
DIASTOLIC BLOOD PRESSURE - MUSE: NORMAL MMHG
EGFRCR SERPLBLD CKD-EPI 2021: >90 ML/MIN/1.73M2
GLUCOSE BLDC GLUCOMTR-MCNC: 113 MG/DL (ref 70–99)
GLUCOSE BLDC GLUCOMTR-MCNC: 81 MG/DL (ref 70–99)
GLUCOSE SERPL-MCNC: 115 MG/DL (ref 70–99)
HOLD SPECIMEN: NORMAL
INTERPRETATION ECG - MUSE: NORMAL
P AXIS - MUSE: 84 DEGREES
POTASSIUM SERPL-SCNC: 3.6 MMOL/L (ref 3.4–5.3)
PR INTERVAL - MUSE: 146 MS
QRS DURATION - MUSE: 102 MS
QT - MUSE: 378 MS
QTC - MUSE: 515 MS
R AXIS - MUSE: -81 DEGREES
SODIUM SERPL-SCNC: 144 MMOL/L (ref 135–145)
SYSTOLIC BLOOD PRESSURE - MUSE: NORMAL MMHG
T AXIS - MUSE: 66 DEGREES
VENTRICULAR RATE- MUSE: 112 BPM

## 2023-12-29 PROCEDURE — 80048 BASIC METABOLIC PNL TOTAL CA: CPT | Performed by: HOSPITALIST

## 2023-12-29 PROCEDURE — 250N000013 HC RX MED GY IP 250 OP 250 PS 637: Performed by: FAMILY MEDICINE

## 2023-12-29 PROCEDURE — G0008 ADMIN INFLUENZA VIRUS VAC: HCPCS | Performed by: HOSPITALIST

## 2023-12-29 PROCEDURE — 36415 COLL VENOUS BLD VENIPUNCTURE: CPT | Performed by: HOSPITALIST

## 2023-12-29 PROCEDURE — 99239 HOSP IP/OBS DSCHRG MGMT >30: CPT | Performed by: HOSPITALIST

## 2023-12-29 PROCEDURE — 250N000011 HC RX IP 250 OP 636: Performed by: HOSPITALIST

## 2023-12-29 PROCEDURE — 90686 IIV4 VACC NO PRSV 0.5 ML IM: CPT | Performed by: HOSPITALIST

## 2023-12-29 PROCEDURE — 250N000013 HC RX MED GY IP 250 OP 250 PS 637: Performed by: HOSPITALIST

## 2023-12-29 RX ORDER — SULFAMETHOXAZOLE/TRIMETHOPRIM 800-160 MG
1 TABLET ORAL EVERY 12 HOURS
Qty: 54 TABLET | Refills: 0 | Status: SHIPPED | OUTPATIENT
Start: 2023-12-29 | End: 2024-01-25

## 2023-12-29 RX ORDER — SULFAMETHOXAZOLE AND TRIMETHOPRIM 200; 40 MG/5ML; MG/5ML
20 SUSPENSION ORAL EVERY 12 HOURS
Qty: 1080 ML | Refills: 0 | Status: SHIPPED | OUTPATIENT
Start: 2023-12-29 | End: 2023-12-29

## 2023-12-29 RX ADMIN — INFLUENZA A VIRUS A/VICTORIA/4897/2022 IVR-238 (H1N1) ANTIGEN (FORMALDEHYDE INACTIVATED), INFLUENZA A VIRUS A/DARWIN/9/2021 SAN-010 (H3N2) ANTIGEN (FORMALDEHYDE INACTIVATED), INFLUENZA B VIRUS B/PHUKET/3073/2013 ANTIGEN (FORMALDEHYDE INACTIVATED), AND INFLUENZA B VIRUS B/MICHIGAN/01/2021 ANTIGEN (FORMALDEHYDE INACTIVATED) 0.5 ML: 15; 15; 15; 15 INJECTION, SUSPENSION INTRAMUSCULAR at 09:52

## 2023-12-29 RX ADMIN — DOCUSATE SODIUM 100 MG: 100 CAPSULE, LIQUID FILLED ORAL at 09:52

## 2023-12-29 RX ADMIN — SULFAMETHOXAZOLE AND TRIMETHOPRIM 160 MG: 200; 40 SUSPENSION ORAL at 06:57

## 2023-12-29 ASSESSMENT — ACTIVITIES OF DAILY LIVING (ADL)
ADLS_ACUITY_SCORE: 51

## 2023-12-29 NOTE — PLAN OF CARE
Goal Outcome Evaluation:      Plan of Care Reviewed With: patient, family          Outcome Evaluation: Wound care demonstrated to family.  Questions encouraged.  Left with family via wheelchair.

## 2023-12-29 NOTE — PROGRESS NOTES
Care Management Discharge Note    Discharge Date: 12/29/2023       Discharge Disposition: Home    Discharge Services:  (home care RN)    Discharge DME:  no new DME    Discharge Transportation: family or friend will provide    Private pay costs discussed: Not applicable    Education Provided on the Discharge Plan:  per care team    Persons Notified of Discharge Plans: daughter-Lidia and [facility]    Patient/Family in Agreement with the Plan: yes    Handoff Referral Completed: Yes    Additional Information:    Patient to discharge to [facility] with [facility] RN. Received call back from Leia with [facility]. There will be a delay in SOC due to change in location.  Lidia notified.     Gina Contreras RN

## 2023-12-29 NOTE — PLAN OF CARE
Goal Outcome Evaluation:    Problem: Adult Inpatient Plan of Care  Goal: Optimal Comfort and Wellbeing  Outcome: Progressing     Problem: Sepsis/Septic Shock  Goal: Blood Glucose Level Within Targeted Range  Outcome: Progressing     Problem: Sepsis/Septic Shock  Goal: Optimal Nutrition Intake  Outcome: Progressing        Pt alert and oriented to self.  and daughter were at bedside throughout shift but went home. Some pain in feet, prn tyl given. Tolerated crushable meds in applesauce. Family bought food from home and pt ate that well. 1000mL output via purewick. Bed alarm on. BG of 173 at dinner time and 188 at bedtime. LLE dressing CDI.

## 2023-12-29 NOTE — PLAN OF CARE
Goal Outcome Evaluation:      Problem: Adult Inpatient Plan of Care  Goal: Absence of Hospital-Acquired Illness or Injury  Intervention: Identify and Manage Fall Risk  Recent Flowsheet Documentation  Taken 12/29/2023 5531 by Annette Logan RN  Safety Promotion/Fall Prevention:   activity supervised   assistive device/personal items within reach     Problem: Sepsis/Septic Shock  Goal: Blood Glucose Level Within Targeted Range  Outcome: Progressing    BG 81, 113.   Pt ate 100% of meals.   Pt appeared comfortable.   No s/s resp distress.

## 2023-12-29 NOTE — PLAN OF CARE
Problem: Adult Inpatient Plan of Care  Goal: Plan of Care Review  Description: The Plan of Care Review/Shift note should be completed every shift.  The Outcome Evaluation is a brief statement about your assessment that the patient is improving, declining, or no change.  This information will be displayed automatically on your shift  note.  Outcome: Progressing   Goal Outcome Evaluation:       Pt Hmong speaking, use language line, pt not responding to questions, dementia at baseline, informed of cares, refused 0200 Blood sugar check, LLE dressing CDl, call light within reach, bed alarm on for safety, slept between cares,

## 2023-12-29 NOTE — DISCHARGE SUMMARY
[facility] MEDICINE  DISCHARGE SUMMARY     Primary Care Physician: Flavio Croft  Admission Date: 12/25/2023   Discharge Provider: Kandy Reyna MD Discharge Date: 12/29/2023   Diet:   Active Diet and Nourishment Order   Procedures    Combination Diet Regular Diet Adult; Moderate Consistent Carb (60 g CHO per Meal) Diet    Diet       Code Status: Full Code   Activity: DCACTIVITY: Activity as tolerated        Condition at Discharge: Stable     REASON FOR PRESENTATION(See Admission Note for Details)   Fever, shaking chills    PRINCIPAL & ACTIVE DISCHARGE DIAGNOSES     Principal Problem:    Cellulitis, unspecified cellulitis site  Active Problems:    RA (rheumatoid arthritis) (H)    Type 2 diabetes mellitus, without long-term current use of insulin (H)    Wound infection    Staphylococcus aureus bacteremia without sepsis      PENDING LABS     Unresulted Labs Ordered in the Past 30 Days of this Admission       Date and Time Order Name Status Description    12/28/2023 12:01 AM Blood Culture Hand, Right Preliminary     12/27/2023 12:00 AM Blood Culture Arm, Right Preliminary     12/26/2023  9:39 AM Blood Culture Arm, Right Preliminary             PROCEDURES ( this hospitalization only)      none    RECOMMENDATIONS TO OUTPATIENT PROVIDER FOR F/U VISIT     Follow-up Appointments     Follow-up and recommended labs and tests       Follow up with primary care provider, Flavio Croft, within 7 days for   hospital follow- up.  The following labs/tests are recommended: basic   metabolic profile, complete blood count.            DISPOSITION     Home with home care    SUMMARY OF HOSPITAL COURSE:      Donal Coker is a 63 year old female with dementia admitted with complicated skin infection and bacteremia. Hospital Day: 5        Sepsis due to right ankle soft tissue infection  MSSA bacteremia  -presented with sepsis based on Temp > 38 C, HR > 90 bpm, and RR > 20 breaths/min due to infection  -blood cultures  from 12/25 with MSSA  -wound culture polymicrobial  -MRI showed no evidence of osteomyelitis, septic arthritis or soft tissue abscess.  -repeat blood culture from 12/26 no growth >48 hours. Blood culture from 12/28 prelim GPCs in clusters. Unclear if true bacteremia or contaminant. Patient clinically doing well. I believe risk of staying in hospital longer is higher than risk of discharge. Would continue with plan for discharge despite possibility ongoing bacteremia.  -In hospital treated with Zosyn. Discharge plan complicated by dementia and patient unlikely to leave PICC in place. She does not swallow pills, but will take meds crushed. ID recommending 4 weeks of Bactrim, as least harm strategy. Patient did tolerate PO Bactrim x2 doses here.  -Podiatry saw and local wound cares recommended  -Seen by wound care nurse, family interested in RN for home wound care at discharge.  -Given advanced dementia no significant workup with MELIDA recommended nor PICC line.   -Encourage good PO at home while on Bactrim. Will need BMP with PCP in 1 week.     Rheumatoid arthritis  --- On Remicade infusion. This should be held while she is on Bactrim.     Dementia  -Advanced. family care for her at home. She verbalizes but does not understand speech.      Discharge Medications with Med changes:     Current Discharge Medication List        START taking these medications    Details   sulfamethoxazole-trimethoprim (BACTRIM/SEPTRA) 8 mg/mL suspension Take 20 mLs (160 mg) by mouth every 12 hours for 27 days  Qty: 1080 mL, Refills: 0    Comments: Dose based on TMP component.  Associated Diagnoses: Wound infection; Cellulitis, unspecified cellulitis site; Staphylococcus aureus bacteremia without sepsis      Wound Dressings (MEDIHONEY WOUND/BURN DRESSING) paste Apply topically every 6 hours as needed (apply with dressing changes to foot wound)  Qty: 44 mL, Refills: 0    Associated Diagnoses: Wound infection; Cellulitis, unspecified cellulitis  site           CONTINUE these medications which have NOT CHANGED    Details   adalimumab (HUMIRA *CF*) 40 MG/0.4ML pen kit Inject 40 mg Subcutaneous every 14 days      BD ULTRA FINE PEN NEEDLES Use as instructed.      Blood Glucose Monitoring Suppl (CONTOUR BLOOD GLUCOSE SYSTEM) YENI Use as directed      Glucose Blood (JESSI CONTOUR TEST) strip Test 3 times daily               Consults     PHARMACY TO DOSE VANCO  CARE MANAGEMENT / SOCIAL WORK IP CONSULT  WOUND OSTOMY CONTINENCE NURSE  IP CONSULT  PHARMACY TO DOSE VANCO  PHARMACY IP CONSULT  INFECTIOUS DISEASES IP CONSULT  PHYSICAL THERAPY ADULT IP CONSULT      SIGNIFICANT IMAGING FINDINGS     Results for orders placed or performed during the hospital encounter of 12/25/23   Head CT w/o contrast    Impression    IMPRESSION:    HEAD CT:  1. Senescent changes and sequelae of chronic microangiopathy without acute intracranial abnormality.    CERVICAL SPINE CT:  1. No acute traumatic injury of the cervical spine.    CT Cervical Spine w/o Contrast    Impression    IMPRESSION:    HEAD CT:  1. Senescent changes and sequelae of chronic microangiopathy without acute intracranial abnormality.    CERVICAL SPINE CT:  1. No acute traumatic injury of the cervical spine.    Ankle XR, G/E 3 views, right    Impression    IMPRESSION: Normal joint spaces and alignment. No or acute fracture. No evidence of osseous destruction to suggest osteomyelitis, however MRI is more sensitive if indicated. Plantar and retrocalcaneal enthesophytes.   Chest XR,  PA & LAT    Impression    IMPRESSION: Heart is normal in size. Lungs are clear.   MR Ankle Right w/o & w Contrast    Impression    IMPRESSION:  1.  No evidence of osteomyelitis, septic arthritis, or soft tissue abscess.  2.  Mild calcaneus osteitis, nonspecific. This could be secondary to early infection or stress response.  3.  Posterior ankle subcutaneous edema and probable cellulitis.  4.  Mild peroneal tenosynovitis.  5.  No ligament or  tendon tear.     US Lower Extremity Arterial Duplex Bilateral    Impression    IMPRESSION:  1.  Significant calcified and noncalcified atherosclerosis throughout the bilateral lower extremities, right slightly greater than left. No evidence of critical stenosis or occlusion bilaterally.     US DAVON with PPG wo Exercise    Impression    IMPRESSION:  1.  RIGHT LOWER EXTREMITY: DAVON at rest is normal.  2.  LEFT LOWER EXTREMITY: DAVON at rest is normal.       SIGNIFICANT LABORATORY FINDINGS     See emr    Discharge Orders        Home Care Referral      Reason for your hospital stay    Infected food wound     Follow-up and recommended labs and tests     Follow up with primary care provider, Flavio Croft, within 7 days for hospital follow- up.  The following labs/tests are recommended: basic metabolic profile, complete blood count.     Activity    Your activity upon discharge: activity as tolerated     When to contact your care team    Call your primary doctor if you have any of the following: chest pain, shortness of breath, fever, chills, fainting, dizziness, vomiting, constipation, dehydration, worsening pain, bleeding, or trouble urinating, or any other symptoms that are new or concerning to you.     Diet    Follow this diet upon discharge: resume your regular diet. Make sure to drink plenty of fluids during the next few weeks while on antibiotics.       Examination   Physical Exam   Temp:  [97.2  F (36.2  C)-97.9  F (36.6  C)] 97.2  F (36.2  C)  Pulse:  [62-67] 67  Resp:  [16-17] 16  BP: (114-123)/(62-81) 114/77  SpO2:  [99 %-100 %] 100 %  Wt Readings from Last 1 Encounters:   12/28/23 30 kg (66 lb 2.2 oz)       General: in no apparent distress, non-toxic, and alert female lying in bed not oriented to person, place or time. Mumbles to herself but does not respond to questions  HEENT: Head normocephalic, fading ecchymosis over R temple, oral mucosa moist. Sclerae anicteric  Skin:  scattered scabs BLE, R foot dressing not  removed for exam  Extremities: No peripheral edema  Psych: Flat affect, mood euthymic  Neuro:  seems globally weak    Please see EMR for more detailed significant labs, imaging, consultant notes etc.    I, Kadny Reyna MD, personally saw the patient today and spent greater than 30 minutes discharging this patient.    Kandy Reyna MD  [facility]    CC:Flavio Croft

## 2023-12-31 LAB — BACTERIA BLD CULT: NO GROWTH

## 2024-01-01 LAB
BACTERIA BLD CULT: ABNORMAL
BACTERIA BLD CULT: NO GROWTH

## 2024-01-03 NOTE — SIGNIFICANT EVENT
Significant Event Note    Time of event: 11:13 AM January 3, 2024    Description of event:    Persistent MSSA bacteremia. Called daughter Lidia and patient taking Bactrim but she thinks still having fevers at home. Hasn't had informational meeting with hospice yet which is planned for this week.  Ms. Coker has advanced dementia and family reporting somewhat conflicting goals for care:  wanting to focus on comfort and quality but also wanting to eliminate blood stream infection.       Advised family that if goal is life prolonging and Ms. Coker is still having fevers with persistent bacteremia that they should return to the ER.  If goal is purely comfort and plan to sign onto hospice for comfort measures then ok to stay home and meet with hospice.      Called Lidia back and family is bringing Ms. Coker either to [place] or [facility].      They are concerned that discharge was too early and there was miscommunication regarding Hospice and plan of care.  Request to speak with patient advocate.  Sounds like there was complicated decision in the setting of advanced dementia and concern about compliance with PICC.  Could consider Dalbevancin if requires ongoing IV therapy and unable to maintain PICC.         Michael Huang MD, [facility]  Internal Medicine Hospitalist  1/3/2024